# Patient Record
Sex: MALE | Race: WHITE | Employment: OTHER | URBAN - METROPOLITAN AREA
[De-identification: names, ages, dates, MRNs, and addresses within clinical notes are randomized per-mention and may not be internally consistent; named-entity substitution may affect disease eponyms.]

---

## 2017-04-17 ENCOUNTER — ALLIED HEALTH/NURSE VISIT (OUTPATIENT)
Dept: NURSING | Facility: CLINIC | Age: 72
End: 2017-04-17
Payer: COMMERCIAL

## 2017-04-17 DIAGNOSIS — H61.23 BILATERAL IMPACTED CERUMEN: Primary | ICD-10-CM

## 2017-04-17 PROCEDURE — 99207 ZZC NO CHARGE LOS: CPT

## 2017-04-17 NOTE — PROGRESS NOTES
Roman Calero is a 72 year old male who presents in clinic for possible impacted cerumen.    SYMPTOMS:  Onset of symptoms: multiple days ago with sudden onset and gradual onset.  He states that he's going to be evaluated for possible need for hearing aids and wanting to have the wax removed from ears first.    Hx of cerumen impaction?   No  Hx of surgery or rupture?  No (if yes, huddle with provider)  OBJECTIVE:  Patient appears in no distress  HEENT: both sides ear canal occluded with cerumen.      RN check prior to irrigation:Diana Costa RN  RN check post irrigation: Diana Costa RN    PLAN:  Cerumenosis is noted Wax is removed by syringing with Ear syringe and manual debridement with 1/2 strength water and 1/2 strength H202 per Tiffanie LACEY MA with assistance of Diana THOMPSON RN       Instructions for home care to prevent wax buildup are given, including OTC agents  Tilt head sideways and instill 5-10 drops twice daily up to 4 days, tip of applicator should not enter ear canal; keep drops in ear for several minutes by keeping head tilted and placing cotton in ear.    NURSING PLAN: Nursing advice to patient schedule his hearing eval      Dolores Costa, RHONDA

## 2017-05-06 DIAGNOSIS — Z12.5 ENCOUNTER FOR SCREENING FOR MALIGNANT NEOPLASM OF PROSTATE: Primary | ICD-10-CM

## 2017-05-09 ENCOUNTER — DOCUMENTATION ONLY (OUTPATIENT)
Dept: LAB | Facility: CLINIC | Age: 72
End: 2017-05-09

## 2017-05-09 DIAGNOSIS — N13.8 BPH (BENIGN PROSTATIC HYPERTROPHY) WITH URINARY OBSTRUCTION: ICD-10-CM

## 2017-05-09 DIAGNOSIS — E78.6 HDL DEFICIENCY: ICD-10-CM

## 2017-05-09 DIAGNOSIS — E78.5 HYPERLIPIDEMIA LDL GOAL <130: Primary | ICD-10-CM

## 2017-05-09 DIAGNOSIS — I25.10 CORONARY ARTERY DISEASE INVOLVING NATIVE CORONARY ARTERY OF NATIVE HEART WITHOUT ANGINA PECTORIS: ICD-10-CM

## 2017-05-09 DIAGNOSIS — R97.20 ELEVATED PSA: ICD-10-CM

## 2017-05-09 DIAGNOSIS — N40.1 BPH (BENIGN PROSTATIC HYPERTROPHY) WITH URINARY OBSTRUCTION: ICD-10-CM

## 2017-05-09 DIAGNOSIS — Z91.89 10 YEAR RISK OF MI OR STROKE 7.5% OR GREATER: ICD-10-CM

## 2017-05-09 NOTE — PROGRESS NOTES
.Please place or confirm pending lab orders for upcoming lab appointment on 05/12/17.  ThanksNadja

## 2017-05-09 NOTE — PROGRESS NOTES
Need previsit labs-see orders and close encounter if nothing else needed./Barbara Gomez,       Physical 5/22/17

## 2017-05-12 DIAGNOSIS — Z12.5 ENCOUNTER FOR SCREENING FOR MALIGNANT NEOPLASM OF PROSTATE: ICD-10-CM

## 2017-05-12 DIAGNOSIS — E78.5 HYPERLIPIDEMIA LDL GOAL <130: ICD-10-CM

## 2017-05-12 DIAGNOSIS — N13.8 BPH (BENIGN PROSTATIC HYPERTROPHY) WITH URINARY OBSTRUCTION: ICD-10-CM

## 2017-05-12 DIAGNOSIS — N40.1 BPH (BENIGN PROSTATIC HYPERTROPHY) WITH URINARY OBSTRUCTION: ICD-10-CM

## 2017-05-12 DIAGNOSIS — I25.10 CORONARY ARTERY DISEASE INVOLVING NATIVE CORONARY ARTERY OF NATIVE HEART WITHOUT ANGINA PECTORIS: ICD-10-CM

## 2017-05-12 DIAGNOSIS — E78.6 HDL DEFICIENCY: ICD-10-CM

## 2017-05-12 DIAGNOSIS — Z91.89 10 YEAR RISK OF MI OR STROKE 7.5% OR GREATER: ICD-10-CM

## 2017-05-12 DIAGNOSIS — R97.20 ELEVATED PSA: ICD-10-CM

## 2017-05-12 LAB
ALBUMIN SERPL-MCNC: 3.7 G/DL (ref 3.4–5)
ALP SERPL-CCNC: 73 U/L (ref 40–150)
ALT SERPL W P-5'-P-CCNC: 38 U/L (ref 0–70)
ANION GAP SERPL CALCULATED.3IONS-SCNC: 8 MMOL/L (ref 3–14)
AST SERPL W P-5'-P-CCNC: 22 U/L (ref 0–45)
BILIRUB SERPL-MCNC: 0.7 MG/DL (ref 0.2–1.3)
BUN SERPL-MCNC: 24 MG/DL (ref 7–30)
CALCIUM SERPL-MCNC: 8.7 MG/DL (ref 8.5–10.1)
CHLORIDE SERPL-SCNC: 108 MMOL/L (ref 94–109)
CHOLEST SERPL-MCNC: 125 MG/DL
CO2 SERPL-SCNC: 28 MMOL/L (ref 20–32)
CREAT SERPL-MCNC: 1.31 MG/DL (ref 0.66–1.25)
GFR SERPL CREATININE-BSD FRML MDRD: 54 ML/MIN/1.7M2
GLUCOSE SERPL-MCNC: 90 MG/DL (ref 70–99)
HDLC SERPL-MCNC: 52 MG/DL
LDLC SERPL CALC-MCNC: 52 MG/DL
NONHDLC SERPL-MCNC: 73 MG/DL
POTASSIUM SERPL-SCNC: 4.5 MMOL/L (ref 3.4–5.3)
PROT SERPL-MCNC: 7.4 G/DL (ref 6.8–8.8)
PSA SERPL-MCNC: 11.2 UG/L (ref 0–4)
SODIUM SERPL-SCNC: 144 MMOL/L (ref 133–144)
TRIGL SERPL-MCNC: 104 MG/DL

## 2017-05-12 PROCEDURE — 36415 COLL VENOUS BLD VENIPUNCTURE: CPT | Performed by: UROLOGY

## 2017-05-12 PROCEDURE — 80061 LIPID PANEL: CPT | Performed by: UROLOGY

## 2017-05-12 PROCEDURE — 80053 COMPREHEN METABOLIC PANEL: CPT | Performed by: UROLOGY

## 2017-05-12 PROCEDURE — 84153 ASSAY OF PSA TOTAL: CPT | Performed by: UROLOGY

## 2017-05-15 NOTE — PROGRESS NOTES
I have reviewed the schedule of future appointments and this patient has an appointment scheduled for 5-.    Visit date not found

## 2017-05-19 ENCOUNTER — TRANSFERRED RECORDS (OUTPATIENT)
Dept: HEALTH INFORMATION MANAGEMENT | Facility: CLINIC | Age: 72
End: 2017-05-19

## 2017-05-22 ENCOUNTER — OFFICE VISIT (OUTPATIENT)
Dept: FAMILY MEDICINE | Facility: CLINIC | Age: 72
End: 2017-05-22
Payer: COMMERCIAL

## 2017-05-22 VITALS
SYSTOLIC BLOOD PRESSURE: 118 MMHG | HEART RATE: 66 BPM | HEIGHT: 74 IN | DIASTOLIC BLOOD PRESSURE: 76 MMHG | WEIGHT: 210 LBS | BODY MASS INDEX: 26.95 KG/M2 | TEMPERATURE: 97.3 F

## 2017-05-22 DIAGNOSIS — R94.4 DECREASED GFR: ICD-10-CM

## 2017-05-22 DIAGNOSIS — Z23 NEED FOR PNEUMOCOCCAL VACCINATION: ICD-10-CM

## 2017-05-22 DIAGNOSIS — Z00.00 ROUTINE GENERAL MEDICAL EXAMINATION AT A HEALTH CARE FACILITY: Primary | ICD-10-CM

## 2017-05-22 LAB
ALBUMIN SERPL-MCNC: 3.6 G/DL (ref 3.4–5)
ALBUMIN UR-MCNC: NEGATIVE MG/DL
ANION GAP SERPL CALCULATED.3IONS-SCNC: 7 MMOL/L (ref 3–14)
APPEARANCE UR: CLEAR
BILIRUB UR QL STRIP: NEGATIVE
BUN SERPL-MCNC: 22 MG/DL (ref 7–30)
CALCIUM SERPL-MCNC: 9 MG/DL (ref 8.5–10.1)
CHLORIDE SERPL-SCNC: 108 MMOL/L (ref 94–109)
CO2 SERPL-SCNC: 27 MMOL/L (ref 20–32)
COLOR UR AUTO: YELLOW
CREAT SERPL-MCNC: 1.22 MG/DL (ref 0.66–1.25)
CREAT UR-MCNC: 139 MG/DL
GFR SERPL CREATININE-BSD FRML MDRD: 59 ML/MIN/1.7M2
GLUCOSE SERPL-MCNC: 83 MG/DL (ref 70–99)
GLUCOSE UR STRIP-MCNC: NEGATIVE MG/DL
HGB BLD-MCNC: 14.1 G/DL (ref 13.3–17.7)
HGB UR QL STRIP: NEGATIVE
KETONES UR STRIP-MCNC: NEGATIVE MG/DL
LEUKOCYTE ESTERASE UR QL STRIP: NEGATIVE
MICROALBUMIN UR-MCNC: 13 MG/L
MICROALBUMIN/CREAT UR: 9.14 MG/G CR (ref 0–17)
NITRATE UR QL: NEGATIVE
PH UR STRIP: 6 PH (ref 5–7)
PHOSPHATE SERPL-MCNC: 3.1 MG/DL (ref 2.5–4.5)
POTASSIUM SERPL-SCNC: 5.1 MMOL/L (ref 3.4–5.3)
PTH-INTACT SERPL-MCNC: 34 PG/ML (ref 12–72)
RBC #/AREA URNS AUTO: NORMAL /HPF (ref 0–2)
SODIUM SERPL-SCNC: 142 MMOL/L (ref 133–144)
SP GR UR STRIP: 1.01 (ref 1–1.03)
URN SPEC COLLECT METH UR: NORMAL
UROBILINOGEN UR STRIP-ACNC: 0.2 EU/DL (ref 0.2–1)
WBC #/AREA URNS AUTO: NORMAL /HPF (ref 0–2)

## 2017-05-22 PROCEDURE — 85018 HEMOGLOBIN: CPT | Performed by: FAMILY MEDICINE

## 2017-05-22 PROCEDURE — 90670 PCV13 VACCINE IM: CPT | Performed by: FAMILY MEDICINE

## 2017-05-22 PROCEDURE — 82043 UR ALBUMIN QUANTITATIVE: CPT | Performed by: FAMILY MEDICINE

## 2017-05-22 PROCEDURE — 80069 RENAL FUNCTION PANEL: CPT | Performed by: FAMILY MEDICINE

## 2017-05-22 PROCEDURE — 83970 ASSAY OF PARATHORMONE: CPT | Performed by: FAMILY MEDICINE

## 2017-05-22 PROCEDURE — G0009 ADMIN PNEUMOCOCCAL VACCINE: HCPCS | Performed by: FAMILY MEDICINE

## 2017-05-22 PROCEDURE — 36415 COLL VENOUS BLD VENIPUNCTURE: CPT | Performed by: FAMILY MEDICINE

## 2017-05-22 PROCEDURE — G0439 PPPS, SUBSEQ VISIT: HCPCS | Mod: 25 | Performed by: FAMILY MEDICINE

## 2017-05-22 PROCEDURE — 81001 URINALYSIS AUTO W/SCOPE: CPT | Performed by: FAMILY MEDICINE

## 2017-05-22 RX ORDER — SPIRONOLACT/HYDROCHLOROTHIAZID 25 MG-25MG
1 TABLET ORAL DAILY
COMMUNITY

## 2017-05-22 RX ORDER — LORATADINE 10 MG/1
10 TABLET ORAL DAILY
COMMUNITY

## 2017-05-22 NOTE — PROGRESS NOTES
SUBJECTIVE:                                                            Roman Calero is a 72 year old male who presents for Preventive Visit.      Are you in the first 12 months of your Medicare Part B coverage?  No    Healthy Habits:    Do you get at least three servings of calcium containing foods daily (dairy, green leafy vegetables, etc.)? yes    Amount of exercise or daily activities, outside of work: 6 day(s) per week    Problems taking medications regularly No    Medication side effects: No    Have you had an eye exam in the past two years? yes    Do you see a dentist twice per year? yes    Do you have sleep apnea, excessive snoring or daytime drowsiness?drowsiness    COGNITIVE SCREEN  1) Repeat 3 items (Banana, Sunrise, Chair)    2) Clock draw: NORMAL  3) 3 item recall: Recalls 3 objects  Results: 3 items recalled: COGNITIVE IMPAIRMENT LESS LIKELY    Mini-CogTM Copyright S Abelino. Licensed by the author for use in [facility]; reprinted with permission (eben@[email or web address]). All rights reserved.                Reviewed and updated as needed this visit by clinical staff  Tobacco  Allergies  Med Hx  Surg Hx  Fam Hx  Soc Hx        Reviewed and updated as needed this visit by Provider        Social History   Substance Use Topics     Smoking status: Never Smoker     Smokeless tobacco: Never Used     Alcohol use Yes      Comment: occ       The patient does not drink >3 drinks per day nor >7 drinks per week.    Today's PHQ-2 Score:   PHQ-2 ( 1999 Pfizer) 12/15/2015 5/7/2015   Q1: Little interest or pleasure in doing things 0 0   Q2: Feeling down, depressed or hopeless 0 0   PHQ-2 Score 0 0       Do you feel safe in your environment - YES    Do you have a Health Care Directive?: Yes: Patient states has Advance Directive and will bring in a copy to clinic.    Current providers sharing in care for this patient include:   Patient Care Team:  Keith Willson MD as PCP - General (Family  "Practice)      Hearing impairment: Yes,     Ability to successfully perform activities of daily living: Yes, no assistance needed     Fall risk:       Home safety:  none identified      The following health maintenance items are reviewed in Epic and correct as of today:  Health Maintenance   Topic Date Due     AORTIC ANEURYSM SCREENING (SYSTEM ASSIGNED)  03/20/2012     PNEUMOCOCCAL (2 of 2 - PCV13) 11/17/2015     INFLUENZA VACCINE (SYSTEM ASSIGNED)  09/01/2017     FALL RISK ASSESSMENT  09/21/2017     ADVANCE DIRECTIVE PLANNING Q5 YRS (NO INBASKET)  10/19/2017     COLON CANCER SCREEN (SYSTEM ASSIGNED)  04/01/2019     LIPID SCREEN Q5 YR MALE (SYSTEM ASSIGNED)  05/12/2022     TETANUS IMMUNIZATION (SYSTEM ASSIGNED)  10/28/2023     HEPATITIS C SCREENING  Completed         Pneumonia Vaccine:Adults age 65+ who received Pneumovax (PPSV23) at 65 years or older: Should be given PCV13 > 1 year after their most recent PPSV23       ASSESSMENT / PLAN:                                                                ICD-10-CM    1. Routine general medical examination at a health care facility Z00.00    2. Decreased GFR R94.4 JUST IN CASE     Hemoglobin     Renal panel     Albumin Random Urine Quantitative     Parathyroid Hormone Intact     UA with Microscopic   3. Need for pneumococcal vaccination Z23 PNEUMOCOCCAL CONJ VACCINE 13 VALENT IM       End of Life Planning:  Patient currently has an advanced directive: Yes.  Practitioner is supportive of decision.    COUNSELING:  Reviewed preventive health counseling, as reflected in patient instructions       Regular exercise       Vision screening       Dental care       Hepatitis C screening       Consider lung cancer screening for ages 55-80 years and 30 pack-year smoking history n/a       Osteoporosis Prevention/Bone Health        Estimated body mass index is 27.33 kg/(m^2) as calculated from the following:    Height as of this encounter: 6' 1.5\" (1.867 m).    Weight as of this " encounter: 210 lb (95.3 kg).     reports that he has never smoked. He has never used smokeless tobacco.      Appropriate preventive services were discussed with this patient, including applicable screening as appropriate for cardiovascular disease, diabetes, osteopenia/osteoporosis, and glaucoma.  As appropriate for age/gender, discussed screening for colorectal cancer, prostate cancer, breast cancer, and cervical cancer. Checklist reviewing preventive services available has been given to the patient.    Reviewed patients plan of care and provided an AVS. The Basic Care Plan (routine screening as documented in Health Maintenance) for Roman meets the Care Plan requirement. This Care Plan has been established and reviewed with the Patient.    Counseling Resources:  ATP IV Guidelines  Pooled Cohorts Equation Calculator  Breast Cancer Risk Calculator  FRAX Risk Assessment  ICSI Preventive Guidelines  Dietary Guidelines for Americans, 2010  [employer]'s MyPlate  ASA Prophylaxis  Lung CA Screening    Keith Willson MD  [facility]  --------------------------------------------------------------------------------------------------------------------------------------    SUBJECTIVE:  Roman Calero is a 72 year old male who presents to the clinic today for a routine physical exam.    The patient's last physical was 1 years ago.     Cholesterol   Date Value Ref Range Status   05/12/2017 125 <200 mg/dL Final   12/07/2015 164 <200 mg/dL Final     HDL Cholesterol   Date Value Ref Range Status   05/12/2017 52 >39 mg/dL Final   12/07/2015 51 >39 mg/dL Final     LDL Cholesterol Calculated   Date Value Ref Range Status   05/12/2017 52 <100 mg/dL Final     Comment:     Desirable:       <100 mg/dl   12/07/2015 94 <100 mg/dL Final     Comment:     Desirable:       <100 mg/dl     Triglycerides   Date Value Ref Range Status   05/12/2017 104 <150 mg/dL Final     Comment:     Fasting specimen   12/07/2015 93 <150 mg/dL Final      Comment:     Fasting specimen     Cholesterol/HDL Ratio   Date Value Ref Range Status   11/17/2014 3.5 0.0 - 5.0 Final   10/28/2013 4.1 0.0 - 5.0 Final     The patient's last fasting lipid panel was done 1 weeks ago and the results are listed above.        The ASCVD Risk score (Rakel ROSARIO Jr, et al., 2013) failed to calculate for the following reasons:    The valid total cholesterol range is 130 to 320 mg/dL        The patient reports that he has been treated for high blood pressure.    The patient reports that he does take a daily aspirin.    Lab Results   Component Value Date    HCVAB Negative 10/28/2013     The patient reports that he has been screened for Hepatitis C    (Screen all baby boomers once per CDC-- the generation born from 1945 through 1965)    Immunization History   Administered Date(s) Administered     Influenza (High Dose) 3 valent vaccine 10/28/2013, 09/21/2016     Influenza (IIV3) 11/14/2014     Pneumococcal 23 valent 11/17/2014     TD (ADULT, 7+) 01/01/2009     TDAP Vaccine (Adacel) 10/28/2013     Zoster vaccine, live 11/01/2014     The patient's believes that his last tetanus shot was given 4 year(s) ago.   The patient believes that he has had a Zostavax in the past  The patient believes that he has had a PPSV23 in the past.  The patient believes that he has not had a PCV13 in the past.  The patient believes that he has had a seasonal flu vaccination this fall or winter.  The patient would like to have a PCV13      No results found for this or any previous visit.]   The patient denies a family history of colon cancer.  The patient reports that he has had a colonoscopy. His  last colonoscopy was in 2009  and he  report that is was normal. The patient was told to have this repeated in 10 years.    The patient reports that he and his wife or partner are not using contraception as she has gone through menopause.    The patient denies a family history of diabetes.    The patient reports that he eats or  drinks 3 servings of dairy products per day. He does take a multivitamin with calcium once daily.  The patient reports that he has dental appointments approximately every 6 months.  The patient reports that he  has an eye examination approximately every 2 year(s).    Do you currently smoke? No  How many years have you smoked? 0   How many packs per day did you smoke on average? N/A  (if more than 30 pack year history and the patient is age 55-80 consider ordering an annual low dose radiation lung CT to screen for cancer)  (Do not order if patient has quit more than 15 years ago or has a health condition that limits life expectancy or could not tolerate curative lung surgery)  Are you interested having a lung CT to screen for lung cancer? N/A    If the patient has smoked more that 100 cigarettes, has the patient had an imaging study (US or CT) for an AAA between the ages of 65 and 75? N/A            Patient Active Problem List   Diagnosis     HDL deficiency     Elevated PSA     Trigger thumb of left hand     Hyperlipidemia LDL goal <130     BPH (benign prostatic hypertrophy) with urinary obstruction     Encounter for counseling     10 year risk of MI or stroke 7.5% or greater, 12.9% in December 2015 on Simvastatin 20     Coronary artery disease involving native coronary artery of native heart without angina pectoris     AMI (acute mesenteric ischemia) (H)       History reviewed. No pertinent surgical history.    Family History   Problem Relation Age of Onset     Hypertension Mother      Lipids Mother      Dementia Mother      CANCER Father      Hodgkin's age 63       Social History     Social History     Marital status:      Spouse name: N/A     Number of children: N/A     Years of education: N/A     Occupational History     Not on file.     Social History Main Topics     Smoking status: Never Smoker     Smokeless tobacco: Never Used     Alcohol use Yes      Comment: occ     Drug use: No     Sexual activity:  "Yes     Partners: Female     Other Topics Concern     Not on file     Social History Narrative       Current Outpatient Prescriptions   Medication Sig Dispense Refill     diphenhydrAMINE-acetaminophen (TYLENOL PM)  MG tablet Take by mouth nightly as needed for sleep       loratadine (CLARITIN) 10 MG tablet Take 10 mg by mouth daily       Multiple Vitamins-Minerals (VITRUM 50+ SENIOR MULTI) TABS Take 1 tablet by mouth daily       Calcium Polycarbophil (FIBER) 625 MG tablet Take 1,350 mg by mouth daily       atorvastatin (LIPITOR) 40 MG tablet Take 40 mg by mouth daily ADDIS.       clopidogrel (PLAVIX) 75 MG tablet Take 75 mg by mouth daily       lisinopril (PRINIVIL,ZESTRIL) 2.5 MG tablet Take 2.5 mg by mouth daily        metoprolol (LOPRESSOR) 25 MG tablet Take 0.25 mg by mouth 2 times daily        nitroglycerin (NITROSTAT) 0.4 MG SL tablet Place 0.4 mg under the tongue every 5 minutes as needed for chest pain if you are still having symptoms after 3 doses (15 minutes) call 911.       Misc Natural Products (FIBER 7 PO) Take 2 tablets by mouth daily.       aspirin 81 MG tablet Take 1 tablet by mouth daily.       Sennosides-Docusate Sodium (STOOL SOFTENER LAXATIVE PO) Take by mouth as needed Reported on 5/22/2017         Review Of Systems    Genitourinary: history of BPH for which he sees urology and her just had a PSA and a rectal exam. He does not drink a lot of fluids in the later evening and did not drink much fluids before his recent blood test.         PHYSICAL EXAMINATION:  Blood pressure 117/67, pulse 66, temperature 97.3  F (36.3  C), temperature source Oral, height 6' 1.5\" (1.867 m), weight 210 lb (95.3 kg).  General appearance - healthy, alert and no distress  Skin - Skin color, texture, turgor normal. No rashes or lesions.  Head - Normocephalic. No masses, lesions, tenderness or abnormalities  Eyes - conjunctivae/corneas clear. PERRL, EOM's intact. Fundi benign  Ears - External ears normal. Canals " clear. TM's normal.  Nose/Sinuses - Nares normal. Septum midline. Mucosa normal. No drainage or sinus tenderness.  Oropharynx - Lips, mucosa, and tongue normal. Teeth and gums normal.  Neck - Neck supple. No adenopathy. Thyroid symmetric, normal size,  Lungs - Percussion normal. Good diaphragmatic excursion. Lungs clear  Heart - PMI normal. No lifts, heaves, or thrills. RRR. No murmurs, clicks gallops or rub  Abdomen - Abdomen soft, non-tender. BS normal. No masses, organomegaly  Extremities - Extremities normal. No deformities, edema, or skin discoloration.  Musculoskeletal - Spine ROM normal. Muscular strength intact.  Peripheral pulses - radial=4/4, femoral=4/4, popliteal=4/4, dorsalis pedis=4/4,  Neuro - Gait normal. Reflexes normal and symmetric. Sensation grossly WNL.  Genitalia - Penis normal. No urethral discharge. Scrotum normal to palpation. No hernia.  Rectal - declined by the patient, see above      Orders Only on 05/12/2017   Component Date Value Ref Range Status     PSA 05/12/2017 11.20* 0 - 4 ug/L Final    Assay Method:  Chemiluminescence using Siemens Vista analyzer     Cholesterol 05/12/2017 125  <200 mg/dL Final     Triglycerides 05/12/2017 104  <150 mg/dL Final    Fasting specimen     HDL Cholesterol 05/12/2017 52  >39 mg/dL Final     LDL Cholesterol Calculated 05/12/2017 52  <100 mg/dL Final    Desirable:       <100 mg/dl     Non HDL Cholesterol 05/12/2017 73  <130 mg/dL Final     Sodium 05/12/2017 144  133 - 144 mmol/L Final     Potassium 05/12/2017 4.5  3.4 - 5.3 mmol/L Final     Chloride 05/12/2017 108  94 - 109 mmol/L Final     Carbon Dioxide 05/12/2017 28  20 - 32 mmol/L Final     Anion Gap 05/12/2017 8  3 - 14 mmol/L Final     Glucose 05/12/2017 90  70 - 99 mg/dL Final    Fasting specimen     Urea Nitrogen 05/12/2017 24  7 - 30 mg/dL Final     Creatinine 05/12/2017 1.31* 0.66 - 1.25 mg/dL Final     GFR Estimate 05/12/2017 54* >60 mL/min/1.7m2 Final    Non  GFR Calc     GFR  Estimate If Black 05/12/2017 65  >60 mL/min/1.7m2 Final    African American GFR Calc     Calcium 05/12/2017 8.7  8.5 - 10.1 mg/dL Final     Bilirubin Total 05/12/2017 0.7  0.2 - 1.3 mg/dL Final     Albumin 05/12/2017 3.7  3.4 - 5.0 g/dL Final     Protein Total 05/12/2017 7.4  6.8 - 8.8 g/dL Final     Alkaline Phosphatase 05/12/2017 73  40 - 150 U/L Final     ALT 05/12/2017 38  0 - 70 U/L Final     AST 05/12/2017 22  0 - 45 U/L Final       ASSESSMENT:    ICD-10-CM    1. Routine general medical examination at a health care facility Z00.00        Well-Adult Physical Exam.  Health Maintenance Due   Topic Date Due     AORTIC ANEURYSM SCREENING (SYSTEM ASSIGNED)  03/20/2012     PNEUMOCOCCAL (2 of 2 - PCV13) 11/17/2015     Health Maintenance   Topic Date Due     AORTIC ANEURYSM SCREENING (SYSTEM ASSIGNED)  03/20/2012     PNEUMOCOCCAL (2 of 2 - PCV13) 11/17/2015     INFLUENZA VACCINE (SYSTEM ASSIGNED)  09/01/2017     FALL RISK ASSESSMENT  09/21/2017     ADVANCE DIRECTIVE PLANNING Q5 YRS (NO INBASKET)  10/19/2017     COLON CANCER SCREEN (SYSTEM ASSIGNED)  04/01/2019     LIPID SCREEN Q5 YR MALE (SYSTEM ASSIGNED)  05/12/2022     TETANUS IMMUNIZATION (SYSTEM ASSIGNED)  10/28/2023     HEPATITIS C SCREENING  Completed         HEALTH CARE MAINTENENCE: The recommended screening tests and vaccinatons for this patient have been discussed as above.  The appropriate tests and vaccinations  have been ordered or declined by the patient. Please see the orders in EPIC.The patient specifically declines: n/a     Immunization Status:  up to date and documented except for PCV13    Patient Active Problem List   Diagnosis     HDL deficiency     Elevated PSA     Trigger thumb of left hand     Hyperlipidemia LDL goal <130     BPH (benign prostatic hypertrophy) with urinary obstruction     Encounter for counseling     10 year risk of MI or stroke 7.5% or greater, 12.9% in December 2015 on Simvastatin 20     Coronary artery disease involving native  coronary artery of native heart without angina pectoris     AMI (acute mesenteric ischemia) (H)        ATP III Guidelines  ICSI Preventive Guidelines    PLAN:   I recommended continuing to take a daily aspirin (81 to 325 mg)  PCV13 recommended  Discussed calcium intake, vitamins and supplements. Recommended 1000 mg of calcium daily  Sunscreen use was recommended especially in the area of tatoos  Recommended dental exams every 6 months  Recommended eye exam every 1-2 years  Follow up in 1 year for the next preventative medical visit      The patient reported that he did not need any refills at this time.      Screening Questionnaire for Adult Immunization    Are you sick today?   No   Do you have allergies to medications, food, a vaccine component or latex?   No   Have you ever had a serious reaction after receiving a vaccination?   No   Do you have a long-term health problem with heart disease, lung disease, asthma, kidney disease, metabolic disease (e.g. diabetes), anemia, or other blood disorder?   No   Do you have cancer, leukemia, HIV/AIDS, or any other immune system problem?   No   In the past 3 months, have you taken medications that affect  your immune system, such as prednisone, other steroids, or anticancer drugs; drugs for the treatment of rheumatoid arthritis, Crohn s disease, or psoriasis; or have you had radiation treatments?   No   Have you had a seizure, or a brain or other nervous system problem?   No   During the past year, have you received a transfusion of blood or blood     products, or been given immune (gamma) globulin or antiviral drug?   No   For women: Are you pregnant or is there a chance you could become        pregnant during the next month?   No   Have you received any vaccinations in the past 4 weeks?   No     Immunization questionnaire answers were all negative.      MNVFC doesn't apply on this patient    Per orders of Dr. Che, injection of PCV13 given by Mariluz Fry. Patient  instructed to remain in clinic for 20 minutes afterwards, and to report any adverse reaction to me immediately.       Screening performed by Mariluz Fry on 5/22/2017 at 12:28 PM.

## 2017-05-22 NOTE — PATIENT INSTRUCTIONS
Preventive Health Recommendations:   Male Ages 65 and over    Yearly exam:             See your health care provider every year in order to  o   Review health changes.   o   Discuss preventive care.    o   Review your medicines if your doctor has prescribed any.    Talk with your health care provider about whether you should have a test to screen for prostate cancer (PSA).    Every 3 years, have a diabetes test (fasting glucose). If you are at risk for diabetes, you should have this test more often.    Every 5 years, have a cholesterol test. Have this test more often if you are at risk for high cholesterol or heart disease.     Every 10 years, have a colonoscopy. Or, have a yearly FIT test (stool test). These exams will check for colon cancer.    Talk to with your health care provider about screening for Abdominal Aortic Aneurysm if you have a family history of AAA or have a history of smoking.    Shots:     Get a flu shot each year.     Get a tetanus shot every 10 years.     Talk to your doctor about your pneumonia vaccines. There are now two you should receive - Pneumovax (PPSV 23) and Prevnar (PCV 13).     Talk to your doctor about a shingles vaccine.     Talk to your doctor about the hepatitis B vaccine.  Nutrition:     Eat at least 5 servings of fruits and vegetables each day.     Eat whole-grain bread, whole-wheat pasta and brown rice instead of white grains and rice.     Talk to your provider about Calcium and Vitamin D.   Lifestyle    Exercise for at least 150 minutes a week (30 minutes a day, 5 days a week). This will help you control your weight and prevent disease.     Limit alcohol to one drink per day.     No smoking.     Wear sunscreen to prevent skin cancer.     See your dentist every six months for an exam and cleaning.     See your eye doctor every 1 to 2 years to screen for conditions such as glaucoma, macular degeneration, cataracts, etc     He was intructed to use mineral or olive oil to  prevent reacummulation of the cerumen. he was advised to put 2-3 drops into each ear 2-3 times a week before showers of baths and to rinse away any of the oily residue at the end of his shower.

## 2017-05-22 NOTE — MR AVS SNAPSHOT
After Visit Summary   5/22/2017    Roman Calero    MRN: [id number]           Patient Information     Date Of Birth          1945        Visit Information        Provider Department      5/22/2017 11:00 AM Keith Willson MD [facility]        Today's Diagnoses     Routine general medical examination at a health care facility    -  1    Decreased GFR        Need for pneumococcal vaccination          Care Instructions      Preventive Health Recommendations:   Male Ages 65 and over    Yearly exam:             See your health care provider every year in order to  o   Review health changes.   o   Discuss preventive care.    o   Review your medicines if your doctor has prescribed any.    Talk with your health care provider about whether you should have a test to screen for prostate cancer (PSA).    Every 3 years, have a diabetes test (fasting glucose). If you are at risk for diabetes, you should have this test more often.    Every 5 years, have a cholesterol test. Have this test more often if you are at risk for high cholesterol or heart disease.     Every 10 years, have a colonoscopy. Or, have a yearly FIT test (stool test). These exams will check for colon cancer.    Talk to with your health care provider about screening for Abdominal Aortic Aneurysm if you have a family history of AAA or have a history of smoking.    Shots:     Get a flu shot each year.     Get a tetanus shot every 10 years.     Talk to your doctor about your pneumonia vaccines. There are now two you should receive - Pneumovax (PPSV 23) and Prevnar (PCV 13).     Talk to your doctor about a shingles vaccine.     Talk to your doctor about the hepatitis B vaccine.  Nutrition:     Eat at least 5 servings of fruits and vegetables each day.     Eat whole-grain bread, whole-wheat pasta and brown rice instead of white grains and rice.     Talk to your provider about Calcium and Vitamin D.   Lifestyle    Exercise for at least  150 minutes a week (30 minutes a day, 5 days a week). This will help you control your weight and prevent disease.     Limit alcohol to one drink per day.     No smoking.     Wear sunscreen to prevent skin cancer.     See your dentist every six months for an exam and cleaning.     See your eye doctor every 1 to 2 years to screen for conditions such as glaucoma, macular degeneration, cataracts, etc     He was intructed to use mineral or olive oil to prevent reacummulation of the cerumen. he was advised to put 2-3 drops into each ear 2-3 times a week before showers of baths and to rinse away any of the oily residue at the end of his shower.          Follow-ups after your visit        Follow-up notes from your care team     Return in about 1 year (around 5/22/2018) for Physical Exam.      Who to contact     If you have questions or need follow up information about today's clinic visit or your schedule please contact [facility] directly at [phone].  Normal or non-critical lab and imaging results will be communicated to you by Redeemhart, letter or phone within 4 business days after the clinic has received the results. If you do not hear from us within 7 days, please contact the clinic through Letsgofordinner or phone. If you have a critical or abnormal lab result, we will notify you by phone as soon as possible.  Submit refill requests through Letsgofordinner or call your pharmacy and they will forward the refill request to us. Please allow 3 business days for your refill to be completed.          Additional Information About Your Visit        Letsgofordinner Information     Letsgofordinner gives you secure access to your electronic health record. If you see a primary care provider, you can also send messages to your care team and make appointments. If you have questions, please call your primary care clinic.  If you do not have a primary care provider, please call [phone] and they will assist you.        Care EveryWhere ID      "This is your Care EveryWhere ID. This could be used by other organizations to access your [place] medical records  [id number]        Your Vitals Were     Pulse Temperature Height BMI (Body Mass Index)          66 97.3  F (36.3  C) (Oral) 6' 1.5\" (1.867 m) 27.33 kg/m2         Blood Pressure from Last 3 Encounters:   05/22/17 118/76   10/06/16 108/60   09/23/16 109/68    Weight from Last 3 Encounters:   05/22/17 210 lb (95.3 kg)   10/06/16 216 lb (98 kg)   09/21/16 214 lb (97.1 kg)              We Performed the Following     Albumin Random Urine Quantitative     Hemoglobin     JUST IN CASE     Parathyroid Hormone Intact     PNEUMOCOCCAL CONJ VACCINE 13 VALENT IM     Renal panel     UA with Microscopic        Primary Care Provider Office Phone # Fax #    Keith Willson [phone]       [facility] [address] [facility] [address]        Thank you!     Thank you for choosing [facility]  for your care. Our goal is always to provide you with excellent care. Hearing back from our patients is one way we can continue to improve our services. Please take a few minutes to complete the written survey that you may receive in the mail after your visit with us. Thank you!             Your Updated Medication List - Protect others around you: Learn how to safely use, store and throw away your medicines at www.disposemymeds.org.          This list is accurate as of: 5/22/17 11:57 AM.  Always use your most recent med list.                   Brand Name Dispense Instructions for use    aspirin 81 MG tablet      Take 1 tablet by mouth daily.       CLARITIN 10 MG tablet   Generic drug:  loratadine      Take 10 mg by mouth daily       diphenhydrAMINE-acetaminophen  MG tablet    TYLENOL PM     Take by mouth nightly as needed for sleep       Fiber 625 MG tablet      Take 1,350 mg by mouth daily       FIBER 7 PO      Take 2 tablets by mouth daily.       LIPITOR 40 MG tablet "   Generic drug:  atorvastatin      Take 40 mg by mouth daily ADDIS.       lisinopril 2.5 MG tablet    PRINIVIL/Zestril     Take 2.5 mg by mouth daily       metoprolol 25 MG tablet    LOPRESSOR     Take 0.25 mg by mouth 2 times daily       NITROSTAT 0.4 MG sublingual tablet   Generic drug:  nitroglycerin      Place 0.4 mg under the tongue every 5 minutes as needed for chest pain if you are still having symptoms after 3 doses (15 minutes) call 911.       PLAVIX 75 MG tablet   Generic drug:  clopidogrel      Take 75 mg by mouth daily       STOOL SOFTENER LAXATIVE PO      Take by mouth as needed Reported on 5/22/2017       VITRUM 50+ SENIOR MULTI Tabs      Take 1 tablet by mouth daily

## 2017-05-22 NOTE — NURSING NOTE
"Chief Complaint   Patient presents with     Wellness Visit       Initial /67  Pulse 66  Temp 97.3  F (36.3  C) (Oral)  Ht 6' 1.5\" (1.867 m)  Wt 210 lb (95.3 kg)  BMI 27.33 kg/m2 Estimated body mass index is 27.33 kg/(m^2) as calculated from the following:    Height as of this encounter: 6' 1.5\" (1.867 m).    Weight as of this encounter: 210 lb (95.3 kg).  Medication Reconciliation: complete  Mariluz Fry M.A.      "

## 2017-05-23 DIAGNOSIS — R94.4 DECREASED GFR: Primary | ICD-10-CM

## 2017-05-23 NOTE — PROGRESS NOTES
Roman,  I have reviewed the results of the laboratory tests that we  Ordered today.. The test of the kidney function looks better than when you were fasting. I want to recheck these labs in 1 month(s) or so and I want you to make sure that you are well hydrated before you have the blood drawn.   Sincerely,   Keith Willson

## 2017-06-19 ENCOUNTER — DOCUMENTATION ONLY (OUTPATIENT)
Dept: VASCULAR SURGERY | Facility: CLINIC | Age: 72
End: 2017-06-19

## 2017-06-19 DIAGNOSIS — Z13.6 ENCOUNTER FOR ABDOMINAL AORTIC ANEURYSM (AAA) SCREENING: Primary | ICD-10-CM

## 2017-08-15 ENCOUNTER — TELEPHONE (OUTPATIENT)
Dept: AUDIOLOGY | Facility: CLINIC | Age: 72
End: 2017-08-15

## 2017-08-15 DIAGNOSIS — H91.93 HEARING DIFFICULTY OF BOTH EARS: Primary | ICD-10-CM

## 2017-08-15 NOTE — TELEPHONE ENCOUNTER
Dear Dr. Willson,     To be in compliance with some payers, we must have a Physician's Order to perform Audiology services. Your patient, Roman, has an appointment to be seen by one of our Audiologists. Please complete the attached referral order.    We thank you for your cooperation. If you have any questions, please feel free to contact me.    Chriss Richardson [facility]  Licensed Audiologist #[id number] [phone]

## 2017-08-16 ENCOUNTER — TELEPHONE (OUTPATIENT)
Dept: AUDIOLOGY | Facility: CLINIC | Age: 72
End: 2017-08-16

## 2017-08-16 NOTE — TELEPHONE ENCOUNTER
Reason for Call:  Other call back    Detailed comments: Wife is wondering if patient needs to having his ears cleaned for his appointment at 3pm. They were unable to get a schedule in with [place] nurse to clean this, please call to advise as appt is 3pm today.     Phone Number Patient can be reached at: Cell number on file:    Telephone Information:   Mobile [phone]       Best Time: any    Can we leave a detailed message on this number? YES    Call taken on 8/16/2017 at 8:44 AM by Chris Sarkar

## 2017-08-16 NOTE — TELEPHONE ENCOUNTER
I spoke with Kaci about Roman's cerumen issues. Kaci feels that Roman's cerumen will be occluding enough to prevent him from obtaining an accurate hearing test. The number to reschedule was provided to Kaci. They will reschedule to a time when Roman can get his ears cleaned prior to the audiology appointment.     -Chriss Richardson CCC-A

## 2017-08-29 ENCOUNTER — ALLIED HEALTH/NURSE VISIT (OUTPATIENT)
Dept: NURSING | Facility: CLINIC | Age: 72
End: 2017-08-29
Payer: COMMERCIAL

## 2017-08-29 DIAGNOSIS — H61.23 BILATERAL IMPACTED CERUMEN: Primary | ICD-10-CM

## 2017-08-29 PROCEDURE — 69209 REMOVE IMPACTED EAR WAX UNI: CPT | Mod: 50

## 2017-08-29 PROCEDURE — 99207 ZZC NO CHARGE NURSE ONLY: CPT

## 2017-08-29 NOTE — PROGRESS NOTES
Roman Calero is a 72 year old male who presents in clinic for possible impacted cerumen.    SYMPTOMS:  Onset of symptoms: weeks ago with gradual onset    Hx of cerumen impaction?   Yes  Hx of surgery or rupture?  No (if yes, huddle with provider)  OBJECTIVE:  Patient appears in no distress  HEENT: both sides ear canal occluded with cerumen.      RN check prior to irrigation:Candy Yoon RN  RN check post irrigation: Candy Yoon RN  Impacted cerumen noted in both ears. Patient tolerated well, no concerns at end of procedure.   Candy Yoon RN   [facility]       PLAN:  Cerumenosis is noted Wax is removed by syringing with Ear syringe and manual debridement with 1/2 strength water and 1/2 strength H202       Instructions for home care to prevent wax buildup are given, including OTC agents  Tilt head sideways and instill 5-10 drops twice daily up to 4 days, tip of applicator should not enter ear canal; keep drops in ear for several minutes by keeping head tilted and placing cotton in ear.    NURSING PLAN:     RECOMMENDED DISPOSITION:    Will comply with recommendation:   Iliana Hernandez MA

## 2017-08-29 NOTE — MR AVS SNAPSHOT
After Visit Summary   8/29/2017    Roman Calero    MRN: [id number]           Patient Information     Date Of Birth          1945        Visit Information        Provider Department      8/29/2017 11:00 AM AN ANCILLARY [facility]        Today's Diagnoses     Bilateral impacted cerumen    -  1       Follow-ups after your visit        Your next 10 appointments already scheduled     Aug 30, 2017 12:30 PM CDT   New Visit with Anatoly Rice   [facility]    [address] [place] [id number] [phone]              Who to contact     If you have questions or need follow up information about today's clinic visit or your schedule please contact [facility] directly at [phone].  Normal or non-critical lab and imaging results will be communicated to you by MyChart, letter or phone within 4 business days after the clinic has received the results. If you do not hear from us within 7 days, please contact the clinic through MyChart or phone. If you have a critical or abnormal lab result, we will notify you by phone as soon as possible.  Submit refill requests through Peppercorn or call your pharmacy and they will forward the refill request to us. Please allow 3 business days for your refill to be completed.          Additional Information About Your Visit        MyChart Information     Peppercorn gives you secure access to your electronic health record. If you see a primary care provider, you can also send messages to your care team and make appointments. If you have questions, please call your primary care clinic.  If you do not have a primary care provider, please call [phone] and they will assist you.        Care EveryWhere ID     This is your Care EveryWhere ID. This could be used by other organizations to access your [place] medical records  [id number]         Blood Pressure from Last 3 Encounters:    05/22/17 118/76   10/06/16 108/60   09/23/16 109/68    Weight from Last 3 Encounters:   05/22/17 210 lb (95.3 kg)   10/06/16 216 lb (98 kg)   09/21/16 214 lb (97.1 kg)              We Performed the Following     HC REMOVAL IMPACTED CERUMEN IRRIGATION/LVG UNILAT        Primary Care Provider Office Phone # Fax #    Keith Willson [phone] [address] [facility] [address]        Equal Access to Services     [facility]: Hadii aad ku hadasho Soomaali, waaxda luqadaha, qaybta kaalmada adeegyamaryanne, curt frank . So [facility] [phone].    ATENCIÓN: Si habla español, tiene a lozano disposición servicios gratuitos de asistencia lingüística. [facility] [phone].    We comply with applicable federal civil rights laws and [place] laws. We do not discriminate on the basis of race, color, national origin, age, disability sex, sexual orientation or gender identity.            Thank you!     Thank you for choosing [facility] [place]  for your care. Our goal is always to provide you with excellent care. Hearing back from our patients is one way we can continue to improve our services. Please take a few minutes to complete the written survey that you may receive in the mail after your visit with us. Thank you!             Your Updated Medication List - Protect others around you: Learn how to safely use, store and throw away your medicines at www.disposemymeds.org.          This list is accurate as of: 8/29/17 11:13 AM.  Always use your most recent med list.                   Brand Name Dispense Instructions for use Diagnosis    aspirin 81 MG tablet      Take 1 tablet by mouth daily.        CLARITIN 10 MG tablet   Generic drug:  loratadine      Take 10 mg by mouth daily        diphenhydrAMINE-acetaminophen  MG tablet    TYLENOL PM     Take by mouth nightly as needed for sleep        Fiber 625 MG tablet      Take 1,350 mg by mouth daily        FIBER 7 PO       Take 2 tablets by mouth daily.        LIPITOR 40 MG tablet   Generic drug:  atorvastatin      Take 40 mg by mouth daily ADDIS.        lisinopril 2.5 MG tablet    PRINIVIL/Zestril     Take 2.5 mg by mouth daily        metoprolol 25 MG tablet    LOPRESSOR     Take 0.25 mg by mouth 2 times daily        NITROSTAT 0.4 MG sublingual tablet   Generic drug:  nitroGLYcerin      Place 0.4 mg under the tongue every 5 minutes as needed for chest pain if you are still having symptoms after 3 doses (15 minutes) call 911.        PLAVIX 75 MG tablet   Generic drug:  clopidogrel      Take 75 mg by mouth daily        STOOL SOFTENER LAXATIVE PO      Take by mouth as needed Reported on 5/22/2017        VITRUM 50+ SENIOR MULTI Tabs      Take 1 tablet by mouth daily

## 2017-08-30 ENCOUNTER — OFFICE VISIT (OUTPATIENT)
Dept: AUDIOLOGY | Facility: CLINIC | Age: 72
End: 2017-08-30
Payer: COMMERCIAL

## 2017-08-30 DIAGNOSIS — H90.3 SENSORINEURAL HEARING LOSS, BILATERAL: Primary | ICD-10-CM

## 2017-08-30 PROCEDURE — 92557 COMPREHENSIVE HEARING TEST: CPT | Performed by: AUDIOLOGIST

## 2017-08-30 PROCEDURE — 92567 TYMPANOMETRY: CPT | Performed by: AUDIOLOGIST

## 2017-08-30 PROCEDURE — 99207 ZZC NO CHARGE LOS: CPT | Performed by: AUDIOLOGIST

## 2017-08-30 NOTE — PROGRESS NOTES
AUDIOLOGY REPORT    SUBJECTIVE:  Roman Calero is a 72 year old male who was seen in the Audiology Clinic at [facility] for audiologic evaluation, referred by Dr. Willson.  The patient reports a gradual decline in hearing. The patient denies  bilateral tinnitus, bilateral otalgia, bilateral drainage, bilateral aural fullness, family history of hearing loss, history of noise exposure, and dizziness.  The patient notes difficulty with communication in a variety of listening situations.  They were accompanied today by their wife, Kaci.     OBJECTIVE:    Otoscopic exam indicates ears are clear of cerumen bilaterally     Pure Tone Thresholds assessed using conventional audiometry with good  reliability from 250-8000 Hz bilaterally using insert earphones     RIGHT:  mild and moderate sensorineural hearing loss    LEFT:    mild and moderate sensorineural hearing loss    Tympanogram:    RIGHT: normal eardrum mobility    LEFT:   normal eardrum mobility    Speech Reception Threshold:    RIGHT: 40 dB HL    LEFT:   40 dB HL    Word Recognition Score:     RIGHT: 72% at 80 dB HL using NU-6 recorded word list.    LEFT:   76% at 80 dB HL using NU-6 recorded word list.      ASSESSMENT:   Sensorineural hearing loss      Today s results were discussed with the patient and spouse in detail. Patient requested and was provided a copy of his audiogram.      PLAN:  Patient was counseled regarding hearing loss and impact on communication.  Patient is a good candidate for amplification at this time.  Handout on good communication strategies, and hearing aid use was given to patient. It is recommended that the patient return for a hearing aid consultation appointment. Please call this clinic with questions regarding these results or recommendations.    Chriss Richardson CCC-A  Licensed Audiologist #[id number]  8/30/2017    CC: Dr. Willson

## 2017-08-30 NOTE — MR AVS SNAPSHOT
After Visit Summary   8/30/2017    Roman Calero    MRN: [id number]           Patient Information     Date Of Birth          1945        Visit Information        Provider Department      8/30/2017 12:30 PM [facility]iss [facility]        Today's Diagnoses     Sensorineural hearing loss, bilateral    -  1       Follow-ups after your visit        Who to contact     If you have questions or need follow up information about today's clinic visit or your schedule please contact [facility] directly at [phone].  Normal or non-critical lab and imaging results will be communicated to you by Benvenue Medicalhart, letter or phone within 4 business days after the clinic has received the results. If you do not hear from us within 7 days, please contact the clinic through Pya Analyticst or phone. If you have a critical or abnormal lab result, we will notify you by phone as soon as possible.  Submit refill requests through Kimeltu or call your pharmacy and they will forward the refill request to us. Please allow 3 business days for your refill to be completed.          Additional Information About Your Visit        MyChart Information     Kimeltu gives you secure access to your electronic health record. If you see a primary care provider, you can also send messages to your care team and make appointments. If you have questions, please call your primary care clinic.  If you do not have a primary care provider, please call [phone] and they will assist you.        Care EveryWhere ID     This is your Care EveryWhere ID. This could be used by other organizations to access your [place] medical records  [id number]         Blood Pressure from Last 3 Encounters:   05/22/17 118/76   10/06/16 108/60   09/23/16 109/68    Weight from Last 3 Encounters:   05/22/17 210 lb (95.3 kg)   10/06/16 216 lb (98 kg)   09/21/16 214 lb (97.1 kg)              We Performed the Following     COMPREHENSIVE  HEARING TEST     TYMPANOMETRY        Primary Care Provider Office Phone # Fax #    Keith Willson [phone] [address] [facility] [address]        Equal Access to Services     EBONIE BOYD : Anthony radha ku ronyo Soraheemali, waaxda luqadaha, qaybta kaalmada adetim, curt hinklebrayan tillman. So [facility] [phone].    ATENCIÓN: Si habla español, tiene a lozano disposición servicios gratuitos de asistencia lingüística. Llame al [phone].    We comply with applicable federal civil rights laws and [place] laws. We do not discriminate on the basis of race, color, national origin, age, disability sex, sexual orientation or gender identity.            Thank you!     Thank you for choosing [facility]  for your care. Our goal is always to provide you with excellent care. Hearing back from our patients is one way we can continue to improve our services. Please take a few minutes to complete the written survey that you may receive in the mail after your visit with us. Thank you!             Your Updated Medication List - Protect others around you: Learn how to safely use, store and throw away your medicines at www.disposemymeds.org.          This list is accurate as of: 8/30/17  1:13 PM.  Always use your most recent med list.                   Brand Name Dispense Instructions for use Diagnosis    aspirin 81 MG tablet      Take 1 tablet by mouth daily.        CLARITIN 10 MG tablet   Generic drug:  loratadine      Take 10 mg by mouth daily        diphenhydrAMINE-acetaminophen  MG tablet    TYLENOL PM     Take by mouth nightly as needed for sleep        Fiber 625 MG tablet      Take 1,350 mg by mouth daily        FIBER 7 PO      Take 2 tablets by mouth daily.        LIPITOR 40 MG tablet   Generic drug:  atorvastatin      Take 40 mg by mouth daily ADDIS.        lisinopril 2.5 MG tablet    PRINIVIL/Zestril     Take 2.5 mg by mouth daily        metoprolol 25 MG  tablet    LOPRESSOR     Take 0.25 mg by mouth 2 times daily        NITROSTAT 0.4 MG sublingual tablet   Generic drug:  nitroGLYcerin      Place 0.4 mg under the tongue every 5 minutes as needed for chest pain if you are still having symptoms after 3 doses (15 minutes) call 911.        PLAVIX 75 MG tablet   Generic drug:  clopidogrel      Take 75 mg by mouth daily        STOOL SOFTENER LAXATIVE PO      Take by mouth as needed Reported on 5/22/2017        VITRUM 50+ SENIOR MULTI Tabs      Take 1 tablet by mouth daily

## 2017-09-12 ENCOUNTER — TRANSFERRED RECORDS (OUTPATIENT)
Dept: HEALTH INFORMATION MANAGEMENT | Facility: CLINIC | Age: 72
End: 2017-09-12

## 2018-02-26 ENCOUNTER — TELEPHONE (OUTPATIENT)
Dept: FAMILY MEDICINE | Facility: CLINIC | Age: 73
End: 2018-02-26

## 2018-02-26 RX ORDER — LISINOPRIL 2.5 MG/1
2.5 TABLET ORAL DAILY
Qty: 30 TABLET | Status: CANCELLED | OUTPATIENT
Start: 2018-02-26

## 2018-02-26 RX ORDER — CLOPIDOGREL BISULFATE 75 MG/1
75 TABLET ORAL DAILY
Qty: 30 TABLET | Status: CANCELLED | OUTPATIENT
Start: 2018-02-26

## 2018-02-26 NOTE — TELEPHONE ENCOUNTER
Patient is requesting a refill for clopidogrel (PLAVIX) 75 MG tablet, and lisinopril (PRINIVIL,ZESTRIL) 2.5 MG tablet   Please call to discuss  Thank you

## 2018-02-26 NOTE — TELEPHONE ENCOUNTER
Spoke to Kaci, spouse, Release to share Personal Health Information is identified, 5/22/17,  is informed of MD note below, as it is written. Verbalized good understanding.  Radha Sahu RN

## 2018-02-26 NOTE — TELEPHONE ENCOUNTER
The patient got these prescription(s) from [facility] cardiology and should get the refill(s) from them as well for now.   Keith Willson MD

## 2018-02-26 NOTE — TELEPHONE ENCOUNTER
Last office visit 5/22/17.    Plan unclear for Plavix and Lisinopril.  Patient was to have repeat Renal panel, which has not been done.  Please advise    Last Basic Metabolic Panel:  Lab Results   Component Value Date     05/22/2017      Lab Results   Component Value Date    POTASSIUM 5.1 05/22/2017     Lab Results   Component Value Date    CHLORIDE 108 05/22/2017     Lab Results   Component Value Date    ALISHA 9.0 05/22/2017     Lab Results   Component Value Date    CO2 27 05/22/2017     Lab Results   Component Value Date    BUN 22 05/22/2017     Lab Results   Component Value Date    CR 1.22 05/22/2017     Lab Results   Component Value Date    GLC 83 05/22/2017     GFR Estimate   Date Value Ref Range Status   05/22/2017 59 (L) >60 mL/min/1.7m2 Final     Comment:     Non  GFR Calc   05/12/2017 54 (L) >60 mL/min/1.7m2 Final     Comment:     Non  GFR Calc   12/07/2015 65 >60 mL/min/1.7m2 Final     Comment:     Non  GFR Calc     GFR Estimate If Black   Date Value Ref Range Status   05/22/2017 71 >60 mL/min/1.7m2 Final     Comment:      GFR Calc   05/12/2017 65 >60 mL/min/1.7m2 Final     Comment:      GFR Calc   12/07/2015 79 >60 mL/min/1.7m2 Final     Comment:      GFR Calc       Radha Sahu RN

## 2018-05-02 ENCOUNTER — DOCUMENTATION ONLY (OUTPATIENT)
Dept: LAB | Facility: CLINIC | Age: 73
End: 2018-05-02

## 2018-05-02 DIAGNOSIS — Z91.89 10 YEAR RISK OF MI OR STROKE 7.5% OR GREATER: ICD-10-CM

## 2018-05-02 DIAGNOSIS — N18.30 CKD (CHRONIC KIDNEY DISEASE) STAGE 3, GFR 30-59 ML/MIN (H): ICD-10-CM

## 2018-05-02 DIAGNOSIS — E78.6 HDL DEFICIENCY: ICD-10-CM

## 2018-05-02 DIAGNOSIS — N13.8 BENIGN PROSTATIC HYPERPLASIA WITH URINARY OBSTRUCTION: Primary | ICD-10-CM

## 2018-05-02 DIAGNOSIS — I25.10 CORONARY ARTERY DISEASE INVOLVING NATIVE CORONARY ARTERY OF NATIVE HEART WITHOUT ANGINA PECTORIS: ICD-10-CM

## 2018-05-02 DIAGNOSIS — E78.5 HYPERLIPIDEMIA LDL GOAL <130: ICD-10-CM

## 2018-05-02 DIAGNOSIS — N40.1 BENIGN PROSTATIC HYPERPLASIA WITH URINARY OBSTRUCTION: Primary | ICD-10-CM

## 2018-05-02 NOTE — PROGRESS NOTES
.Please place or confirm pending lab orders for upcoming lab appointment on 5/14/18  Thank you,  Payal

## 2018-05-02 NOTE — PROGRESS NOTES
Please review add or remove and sign Pending Pre-visit Labs in Epic.Labs 05/14/18 and Physical 05/23/18   Eloisa SALINAS

## 2018-05-14 DIAGNOSIS — N18.30 CKD (CHRONIC KIDNEY DISEASE) STAGE 3, GFR 30-59 ML/MIN (H): ICD-10-CM

## 2018-05-14 DIAGNOSIS — N13.8 BENIGN PROSTATIC HYPERPLASIA WITH URINARY OBSTRUCTION: ICD-10-CM

## 2018-05-14 DIAGNOSIS — I25.10 CORONARY ARTERY DISEASE INVOLVING NATIVE CORONARY ARTERY OF NATIVE HEART WITHOUT ANGINA PECTORIS: ICD-10-CM

## 2018-05-14 DIAGNOSIS — N40.1 BENIGN PROSTATIC HYPERPLASIA WITH URINARY OBSTRUCTION: ICD-10-CM

## 2018-05-14 DIAGNOSIS — R97.20 ELEVATED PSA: ICD-10-CM

## 2018-05-14 DIAGNOSIS — E78.5 HYPERLIPIDEMIA LDL GOAL <130: ICD-10-CM

## 2018-05-14 LAB
ALBUMIN SERPL-MCNC: 3.6 G/DL (ref 3.4–5)
ALP SERPL-CCNC: 69 U/L (ref 40–150)
ALT SERPL W P-5'-P-CCNC: 31 U/L (ref 0–70)
ANION GAP SERPL CALCULATED.3IONS-SCNC: 6 MMOL/L (ref 3–14)
AST SERPL W P-5'-P-CCNC: 23 U/L (ref 0–45)
BILIRUB SERPL-MCNC: 0.7 MG/DL (ref 0.2–1.3)
BUN SERPL-MCNC: 27 MG/DL (ref 7–30)
CALCIUM SERPL-MCNC: 8.8 MG/DL (ref 8.5–10.1)
CHLORIDE SERPL-SCNC: 109 MMOL/L (ref 94–109)
CHOLEST SERPL-MCNC: 129 MG/DL
CO2 SERPL-SCNC: 27 MMOL/L (ref 20–32)
CREAT SERPL-MCNC: 1.34 MG/DL (ref 0.66–1.25)
CREAT UR-MCNC: 134 MG/DL
GFR SERPL CREATININE-BSD FRML MDRD: 53 ML/MIN/1.7M2
GLUCOSE SERPL-MCNC: 85 MG/DL (ref 70–99)
HDLC SERPL-MCNC: 46 MG/DL
HGB BLD-MCNC: 14.9 G/DL (ref 13.3–17.7)
LDLC SERPL CALC-MCNC: 59 MG/DL
MICROALBUMIN UR-MCNC: 15 MG/L
MICROALBUMIN/CREAT UR: 11.49 MG/G CR (ref 0–17)
NONHDLC SERPL-MCNC: 83 MG/DL
POTASSIUM SERPL-SCNC: 4.9 MMOL/L (ref 3.4–5.3)
PROT SERPL-MCNC: 7.4 G/DL (ref 6.8–8.8)
PSA SERPL-ACNC: 11.2 UG/L (ref 0–4)
PTH-INTACT SERPL-MCNC: 47 PG/ML (ref 18–80)
SODIUM SERPL-SCNC: 142 MMOL/L (ref 133–144)
TRIGL SERPL-MCNC: 118 MG/DL

## 2018-05-14 PROCEDURE — 83970 ASSAY OF PARATHORMONE: CPT | Performed by: FAMILY MEDICINE

## 2018-05-14 PROCEDURE — 82043 UR ALBUMIN QUANTITATIVE: CPT | Performed by: FAMILY MEDICINE

## 2018-05-14 PROCEDURE — 80053 COMPREHEN METABOLIC PANEL: CPT | Performed by: FAMILY MEDICINE

## 2018-05-14 PROCEDURE — 84153 ASSAY OF PSA TOTAL: CPT | Performed by: FAMILY MEDICINE

## 2018-05-14 PROCEDURE — 36415 COLL VENOUS BLD VENIPUNCTURE: CPT | Performed by: FAMILY MEDICINE

## 2018-05-14 PROCEDURE — 85018 HEMOGLOBIN: CPT | Performed by: FAMILY MEDICINE

## 2018-05-14 PROCEDURE — 80061 LIPID PANEL: CPT | Performed by: FAMILY MEDICINE

## 2018-05-30 ENCOUNTER — TRANSFERRED RECORDS (OUTPATIENT)
Dept: HEALTH INFORMATION MANAGEMENT | Facility: CLINIC | Age: 73
End: 2018-05-30

## 2018-06-12 ENCOUNTER — OFFICE VISIT (OUTPATIENT)
Dept: FAMILY MEDICINE | Facility: CLINIC | Age: 73
End: 2018-06-12
Payer: COMMERCIAL

## 2018-06-12 VITALS
RESPIRATION RATE: 20 BRPM | TEMPERATURE: 97 F | HEART RATE: 56 BPM | BODY MASS INDEX: 28.1 KG/M2 | HEIGHT: 73 IN | SYSTOLIC BLOOD PRESSURE: 113 MMHG | WEIGHT: 212 LBS | DIASTOLIC BLOOD PRESSURE: 63 MMHG | OXYGEN SATURATION: 98 %

## 2018-06-12 DIAGNOSIS — I25.10 CORONARY ARTERY DISEASE INVOLVING NATIVE CORONARY ARTERY OF NATIVE HEART WITHOUT ANGINA PECTORIS: ICD-10-CM

## 2018-06-12 DIAGNOSIS — R97.20 ELEVATED PSA: ICD-10-CM

## 2018-06-12 DIAGNOSIS — N40.1 BENIGN PROSTATIC HYPERPLASIA WITH URINARY OBSTRUCTION: ICD-10-CM

## 2018-06-12 DIAGNOSIS — N13.8 BENIGN PROSTATIC HYPERPLASIA WITH URINARY OBSTRUCTION: ICD-10-CM

## 2018-06-12 DIAGNOSIS — E78.6 HDL DEFICIENCY: ICD-10-CM

## 2018-06-12 DIAGNOSIS — Z00.00 MEDICARE ANNUAL WELLNESS VISIT, SUBSEQUENT: Primary | ICD-10-CM

## 2018-06-12 DIAGNOSIS — H61.23 BILATERAL IMPACTED CERUMEN: ICD-10-CM

## 2018-06-12 DIAGNOSIS — Z91.89 10 YEAR RISK OF MI OR STROKE 7.5% OR GREATER: ICD-10-CM

## 2018-06-12 DIAGNOSIS — N18.30 CKD (CHRONIC KIDNEY DISEASE) STAGE 3, GFR 30-59 ML/MIN (H): ICD-10-CM

## 2018-06-12 PROCEDURE — 69209 REMOVE IMPACTED EAR WAX UNI: CPT | Mod: 50 | Performed by: FAMILY MEDICINE

## 2018-06-12 PROCEDURE — G0439 PPPS, SUBSEQ VISIT: HCPCS | Performed by: FAMILY MEDICINE

## 2018-06-12 ASSESSMENT — ACTIVITIES OF DAILY LIVING (ADL)
I_NEED_ASSISTANCE_FOR_THE_FOLLOWING_DAILY_ACTIVITIES:: NO ASSISTANCE IS NEEDED
CURRENT_FUNCTION: NO ASSISTANCE NEEDED

## 2018-06-12 ASSESSMENT — PAIN SCALES - GENERAL: PAINLEVEL: NO PAIN (0)

## 2018-06-12 NOTE — MR AVS SNAPSHOT
After Visit Summary   6/12/2018    Roman Calero    MRN: [id number]           Patient Information     Date Of Birth          1945        Visit Information        Provider Department      6/12/2018 11:45 AM Keith Willson MD [facility]        Today's Diagnoses     Medicare annual wellness visit, subsequent    -  1    Benign prostatic hyperplasia with urinary obstruction        CKD (chronic kidney disease) stage 3, GFR 30-59 ml/min        Elevated PSA        10 year risk of MI or stroke 7.5% or greater, 12.9% in December 2015 on Simvastatin 20        Coronary artery disease involving native coronary artery of native heart without angina pectoris        HDL deficiency        Bilateral impacted cerumen          Care Instructions      Preventive Health Recommendations:       Male Ages 65 and over    Yearly exam:             See your health care provider every year in order to  o   Review health changes.   o   Discuss preventive care.    o   Review your medicines if your doctor has prescribed any.    Talk with your health care provider about whether you should have a test to screen for prostate cancer (PSA).    Every 3 years, have a diabetes test (fasting glucose). If you are at risk for diabetes, you should have this test more often.    Every 5 years, have a cholesterol test. Have this test more often if you are at risk for high cholesterol or heart disease.     Every 10 years, have a colonoscopy. Or, have a yearly FIT test (stool test). These exams will check for colon cancer.    Talk to with your health care provider about screening for Abdominal Aortic Aneurysm if you have a family history of AAA or have a history of smoking.  Shots:     Get a flu shot each year.     Get a tetanus shot every 10 years.     Talk to your doctor about your pneumonia vaccines. There are now two you should receive - Pneumovax (PPSV 23) and Prevnar (PCV 13).    Talk to your doctor about a shingles  vaccine.     Talk to your doctor about the hepatitis B vaccine.  Nutrition:     Eat at least 5 servings of fruits and vegetables each day.     Eat whole-grain bread, whole-wheat pasta and brown rice instead of white grains and rice.     Talk to your doctor about Calcium and Vitamin D.   Lifestyle    Exercise for at least 150 minutes a week (30 minutes a day, 5 days a week). This will help you control your weight and prevent disease.     Limit alcohol to one drink per day.     No smoking.     Wear sunscreen to prevent skin cancer.     See your dentist every six months for an exam and cleaning.     See your eye doctor every 1 to 2 years to screen for conditions such as glaucoma, macular degeneration and cataracts.      Please get us a copy of your Advanced Health Care Directive    You are due for a colonoscopy in January 209. Please schedule that and get us the records faxed to [phone]    You are a candidate to get the Shingrix vaccination(s)           Follow-ups after your visit        Follow-up notes from your care team     Return in about 1 year (around 6/12/2019) for Physical Exam.      Who to contact     If you have questions or need follow up information about today's clinic visit or your schedule please contact [facility] directly at [phone].  Normal or non-critical lab and imaging results will be communicated to you by MyChart, letter or phone within 4 business days after the clinic has received the results. If you do not hear from us within 7 days, please contact the clinic through [employer] or phone. If you have a critical or abnormal lab result, we will notify you by phone as soon as possible.  Submit refill requests through [employer] or call your pharmacy and they will forward the refill request to us. Please allow 3 business days for your refill to be completed.          Additional Information About Your Visit        [employer] Information     [employer] gives you secure access to your  "electronic health record. If you see a primary care provider, you can also send messages to your care team and make appointments. If you have questions, please call your primary care clinic.  If you do not have a primary care provider, please call [phone] and they will assist you.        Care EveryWhere ID     This is your Care EveryWhere ID. This could be used by other organizations to access your [place] medical records  [id number]        Your Vitals Were     Pulse Temperature Respirations Height Pulse Oximetry BMI (Body Mass Index)    56 97  F (36.1  C) (Oral) 20 6' 1\" (1.854 m) 98% 27.97 kg/m2       Blood Pressure from Last 3 Encounters:   06/12/18 113/63   05/22/17 118/76   10/06/16 108/60    Weight from Last 3 Encounters:   06/12/18 212 lb (96.2 kg)   05/22/17 210 lb (95.3 kg)   10/06/16 216 lb (98 kg)              We Performed the Following     PAF COMPLETED     REMOVE IMPACTED CERUMEN          Today's Medication Changes          These changes are accurate as of 6/12/18 12:34 PM.  If you have any questions, ask your nurse or doctor.               Stop taking these medicines if you haven't already. Please contact your care team if you have questions.     STOOL SOFTENER LAXATIVE PO   Stopped by:  Keith Willson MD                    Primary Care Provider Office Phone # Fax #    Keith Willson [phone] [address] [facility] [address]        Equal Access to Services     [facility] : Hadii aad ku hadasho Soomaali, waaxda luqadaha, qaybta kaalmada amberlyyamaryanne, curt frank . So [facility] [phone].    ATENCIÓN: Si habla español, tiene a lozano disposición servicios gratuitos de asistencia lingüística. Llame al [phone].    We comply with applicable federal civil rights laws and [place] laws. We do not discriminate on the basis of race, color, national origin, age, disability, sex, sexual orientation, or gender identity.            Thank " you!     Thank you for choosing [facility]  for your care. Our goal is always to provide you with excellent care. Hearing back from our patients is one way we can continue to improve our services. Please take a few minutes to complete the written survey that you may receive in the mail after your visit with us. Thank you!             Your Updated Medication List - Protect others around you: Learn how to safely use, store and throw away your medicines at www.disposemymeds.org.          This list is accurate as of 6/12/18 12:34 PM.  Always use your most recent med list.                   Brand Name Dispense Instructions for use Diagnosis    aspirin 81 MG tablet      Take 1 tablet by mouth daily.        CLARITIN 10 MG tablet   Generic drug:  loratadine      Take 10 mg by mouth daily        diphenhydrAMINE-acetaminophen  MG tablet    TYLENOL PM     Take by mouth nightly as needed for sleep        Fiber 625 MG tablet      Take 1,350 mg by mouth daily        FIBER 7 PO      Take 2 tablets by mouth daily.        LIPITOR 40 MG tablet   Generic drug:  atorvastatin      Take 40 mg by mouth daily ADDIS.        lisinopril 2.5 MG tablet    PRINIVIL/Zestril     Take 2.5 mg by mouth daily        metoprolol tartrate 25 MG tablet    LOPRESSOR     Take 0.25 mg by mouth 2 times daily        NITROSTAT 0.4 MG sublingual tablet   Generic drug:  nitroGLYcerin      Place 0.4 mg under the tongue every 5 minutes as needed for chest pain if you are still having symptoms after 3 doses (15 minutes) call 911.        PLAVIX 75 MG tablet   Generic drug:  clopidogrel      Take 75 mg by mouth daily        VITRUM 50+ SENIOR MULTI Tabs      Take 1 tablet by mouth daily

## 2018-06-12 NOTE — PROGRESS NOTES
SUBJECTIVE:   Roman Calero is a 73 year old male who presents for Preventive Visit.      Are you in the first 12 months of your Medicare coverage?  No    Physical   Annual:     Getting at least 3 servings of Calcium per day::  Yes    Bi-annual eye exam::  Yes    Dental care twice a year::  Yes    Sleep apnea or symptoms of sleep apnea::  None    Diet::  Low salt and Low fat/cholesterol    Taking medications regularly::  Yes    Additional concerns today::  No    Ability to successfully perform activities of daily living: no assistance needed  Home Safety:  Throw rugs in the hallway and lack of grab bars in the bathroom  Hearing Impairment: difficulty following a conversation in a noisy restaurant or crowded room, feel that people are mumbling or not speaking clearly, difficulty following dialogue in the theater, difficult to understand a speaker at a public meeting or Faith service and need to ask people to speak up or repeat themselves      Fall risk:       COGNITIVE SCREEN  1) Repeat 3 items (Banana, Sunrise, Chair)    2) Clock draw: NORMAL  3) 3 item recall: Recalls 3 objects  Results: 3 items recalled: COGNITIVE IMPAIRMENT LESS LIKELY    Mini-CogTM Copyright ZAFAR Roca. Licensed by the author for use in [facility]; reprinted with permission (eben@[facility]). All rights reserved.        Reviewed and updated as needed this visit by clinical staff         Reviewed and updated as needed this visit by Provider        Social History   Substance Use Topics     Smoking status: Never Smoker     Smokeless tobacco: Never Used     Alcohol use Yes      Comment: occ       Alcohol Use 6/12/2018   If you drink alcohol do you typically have greater than 3 drinks per day OR greater than 7 drinks per week? No   No flowsheet data found.            Today's PHQ-2 Score:   PHQ-2 ( 1999 [employer]) 6/12/2018   Q1: Little interest or pleasure in doing things 0   Q2: Feeling down, depressed or hopeless 0   PHQ-2 Score 0   Q1:  "Little interest or pleasure in doing things Not at all   Q2: Feeling down, depressed or hopeless Not at all   PHQ-2 Score 0       Do you feel safe in your environment - Yes    Do you have a Health Care Directive?: Yes: Patient states has Advance Directive and will bring in a copy to clinic.    Current providers sharing in care for this patient include:   Patient Care Team:  Keith Willson MD as PCP - General (Family Practice)    The following health maintenance items are reviewed in Epic and correct as of today:  Health Maintenance   Topic Date Due     FALL RISK ASSESSMENT  09/21/2017     ADVANCE DIRECTIVE PLANNING Q5 YRS  10/19/2017     COLON CANCER SCREEN (SYSTEM ASSIGNED)  04/01/2019     BMP Q1 YR  05/14/2019     HEMOGLOBIN Q1 YR  05/14/2019     LIPID MONITORING Q1 YEAR  05/14/2019     MICROALBUMIN Q1 YEAR  05/14/2019     TETANUS IMMUNIZATION (SYSTEM ASSIGNED)  10/28/2023     PNEUMOCOCCAL  Completed     INFLUENZA VACCINE  Completed     AORTIC ANEURYSM SCREENING (SYSTEM ASSIGNED)  Completed     HEPATITIS C SCREENING  Completed             Review of Systems      OBJECTIVE:   There were no vitals taken for this visit. Estimated body mass index is 27.33 kg/(m^2) as calculated from the following:    Height as of 5/22/17: 6' 1.5\" (1.867 m).    Weight as of 5/22/17: 210 lb (95.3 kg).  Physical Exam      ASSESSMENT / PLAN:       ICD-10-CM    1. Medicare annual wellness visit, subsequent Z00.00    2. Benign prostatic hyperplasia with urinary obstruction N40.1     N13.8    3. CKD (chronic kidney disease) stage 3, GFR 30-59 ml/min N18.3    4. Elevated PSA R97.20    5. 10 year risk of MI or stroke 7.5% or greater, 12.9% in December 2015 on Simvastatin 20 Z91.89    6. Coronary artery disease involving native coronary artery of native heart without angina pectoris I25.10    7. HDL deficiency E78.6    8. Bilateral impacted cerumen H61.23 REMOVE IMPACTED CERUMEN       End of Life Planning:  Patient currently has an advanced " "directive: Yes.  Practitioner is supportive of decision.    COUNSELING:  Reviewed preventive health counseling, as reflected in patient instructions       Regular exercise       Healthy diet/nutrition       Vision screening       Dental care       Immunizations    Declined: Shingrix due to Other he wants to think about it               Colon cancer screening       Prostate cancer screening       Osteoporosis Prevention/Bone Health        Estimated body mass index is 27.33 kg/(m^2) as calculated from the following:    Height as of 5/22/17: 6' 1.5\" (1.867 m).    Weight as of 5/22/17: 210 lb (95.3 kg).     reports that he has never smoked. He has never used smokeless tobacco.      Appropriate preventive services were discussed with this patient, including applicable screening as appropriate for cardiovascular disease, diabetes, osteopenia/osteoporosis, and glaucoma.  As appropriate for age/gender, discussed screening for colorectal cancer, prostate cancer, breast cancer, and cervical cancer. Checklist reviewing preventive services available has been given to the patient.    Reviewed patients plan of care and provided an AVS. The Basic Care Plan (routine screening as documented in Health Maintenance) for Roman meets the Care Plan requirement. This Care Plan has been established and reviewed with the Patient.    Counseling Resources:  ATP IV Guidelines  Pooled Cohorts Equation Calculator  Breast Cancer Risk Calculator  FRAX Risk Assessment  ICSI Preventive Guidelines  Dietary Guidelines for Americans, 2010  [employer]'s MyPlate  ASA Prophylaxis  Lung CA Screening    Keith Willson MD  [facility]  --------------------------------------------------------------------------------------------------------------------------------------    SUBJECTIVE:  Roman Calero is a 73 year old male who presents to the clinic today for a routine physical exam.    The patient's last physical was 1 years ago.     Cholesterol "   Date Value Ref Range Status   05/14/2018 129 <200 mg/dL Final   05/12/2017 125 <200 mg/dL Final     HDL Cholesterol   Date Value Ref Range Status   05/14/2018 46 >39 mg/dL Final   05/12/2017 52 >39 mg/dL Final     LDL Cholesterol Calculated   Date Value Ref Range Status   05/14/2018 59 <100 mg/dL Final     Comment:     Desirable:       <100 mg/dl   05/12/2017 52 <100 mg/dL Final     Comment:     Desirable:       <100 mg/dl     Triglycerides   Date Value Ref Range Status   05/14/2018 118 <150 mg/dL Final     Comment:     Fasting specimen   05/12/2017 104 <150 mg/dL Final     Comment:     Fasting specimen     Cholesterol/HDL Ratio   Date Value Ref Range Status   11/17/2014 3.5 0.0 - 5.0 Final   10/28/2013 4.1 0.0 - 5.0 Final     The patient's last fasting lipid panel was done 1 months ago and the results are listed above.        The ASCVD Risk score (Rakel MARLENE Jr, et al., 2013) failed to calculate for the following reasons:    The valid total cholesterol range is 130 to 320 mg/dL        The patient reports that he has been treated for high blood pressure.    The patient reports that he does take a daily aspirin.    Lab Results   Component Value Date    HCVAB Negative 10/28/2013     The patient reports that he has been screened for Hepatitis C    (Screen all baby boomers once per CDC-- the generation born from 1945 through 1965)    Immunization History   Administered Date(s) Administered     Influenza (High Dose) 3 valent vaccine 10/28/2013, 09/21/2016, 10/18/2017     Influenza (IIV3) PF 11/14/2014     Pneumo Conj 13-V (2010&after) 05/22/2017     Pneumococcal 23 valent 11/17/2014     TD (ADULT, 7+) 01/01/2009     TDAP Vaccine (Adacel) 10/28/2013     Zoster vaccine, live 11/01/2014     The patient's believes that his last tetanus shot was given 5 year(s) ago.   The patient believes that he has not had a Shingrix in the past  The patient believes that he has had a PPSV23 in the past.  The patient believes that he has had  a PCV13 in the past.  The patient believes that he has had a seasonal flu vaccination this fall or winter.  The patient would like to have a no vaccinations today      No results found for this or any previous visit.]   The patient denies a family history of colon cancer.  The patient reports that he has had a colonoscopy. His  last colonoscopy was in 2009 and he  report that is was normal. The patient was told to have this repeated in 10 years. He wasn't to have it done at a place in [place]. He is going to arrange that and I recommend(ed) that he have it done in January 2019.      The patient reports that he and his wife or partner are not using contraception as she has gone through menopause.    The patient denies a family history of diabetes.  The patient denies a family history of prostate cancer. He saw his Urologist Dr Delcid, and had a PSA that was stable.    The patient reports that he eats or drinks 3 servings of dairy products per day. He does take a multivitamin with calcium once daily.  The patient reports that he has dental appointments approximately every 6 months.  The patient reports that he  has an eye examination approximately every 2 year(s).    Do you currently smoke? No  How many years have you smoked? 0   How many packs per day did you smoke on average? N/A  (if more than 30 pack year history and the patient is age 55-80 consider ordering an annual low dose radiation lung CT to screen for cancer)  (Do not order if patient has quit more than 15 years ago or has a health condition that limits life expectancy or could not tolerate curative lung surgery)  Are you interested having a lung CT to screen for lung cancer? N/A    If the patient has smoked more that 100 cigarettes, has the patient had an imaging study (US or CT) for an AAA between the ages of 65 and 75? N/A              Patient Active Problem List   Diagnosis     HDL deficiency     Elevated PSA     Trigger thumb of left hand      Hyperlipidemia LDL goal <130     Benign prostatic hyperplasia with urinary obstruction     Encounter for counseling     10 year risk of MI or stroke 7.5% or greater, 12.9% in December 2015 on Simvastatin 20     Coronary artery disease involving native coronary artery of native heart without angina pectoris     AMI (acute mesenteric ischemia) (H)     CKD (chronic kidney disease) stage 3, GFR 30-59 ml/min       History reviewed. No pertinent surgical history.    Family History   Problem Relation Age of Onset     Hypertension Mother      Lipids Mother      Dementia Mother      CANCER Father      Hodgkin's age 63       Social History     Social History     Marital status:      Spouse name: N/A     Number of children: N/A     Years of education: N/A     Occupational History     Not on file.     Social History Main Topics     Smoking status: Never Smoker     Smokeless tobacco: Never Used     Alcohol use Yes      Comment: occ     Drug use: No     Sexual activity: Yes     Partners: Female     Other Topics Concern     Not on file     Social History Narrative       Current Outpatient Prescriptions   Medication Sig Dispense Refill     aspirin 81 MG tablet Take 1 tablet by mouth daily.       atorvastatin (LIPITOR) 40 MG tablet Take 40 mg by mouth daily ADDIS.       Calcium Polycarbophil (FIBER) 625 MG tablet Take 1,350 mg by mouth daily       clopidogrel (PLAVIX) 75 MG tablet Take 75 mg by mouth daily       diphenhydrAMINE-acetaminophen (TYLENOL PM)  MG tablet Take by mouth nightly as needed for sleep       lisinopril (PRINIVIL,ZESTRIL) 2.5 MG tablet Take 2.5 mg by mouth daily        loratadine (CLARITIN) 10 MG tablet Take 10 mg by mouth daily       metoprolol (LOPRESSOR) 25 MG tablet Take 0.25 mg by mouth 2 times daily        Misc Natural Products (FIBER 7 PO) Take 2 tablets by mouth daily.       Multiple Vitamins-Minerals (VITRUM 50+ SENIOR MULTI) TABS Take 1 tablet by mouth daily       nitroglycerin (NITROSTAT)  "0.4 MG SL tablet Place 0.4 mg under the tongue every 5 minutes as needed for chest pain if you are still having symptoms after 3 doses (15 minutes) call 911.         PHYSICAL EXAMINATION:  Blood pressure 113/63, pulse 56, temperature 97  F (36.1  C), temperature source Oral, resp. rate 20, height 6' 1\" (1.854 m), weight 212 lb (96.2 kg), SpO2 98 %.  General appearance - healthy, alert and no distress  Skin - Skin color, texture, turgor normal. No rashes or lesions.  Head - Normocephalic. No masses, lesions, tenderness or abnormalities  Eyes - conjunctivae/corneas clear. PERRL, EOM's intact. Fundi benign  Ears - External ears normal. Canals clear. TM's normal., positive findings: cerumen bilaterally  Nose/Sinuses - Nares normal. Septum midline. Mucosa normal. No drainage or sinus tenderness.  Oropharynx - Lips, mucosa, and tongue normal. Teeth and gums normal.  Neck - Neck supple. No adenopathy. Thyroid symmetric, normal size,  Lungs - Percussion normal. Good diaphragmatic excursion. Lungs clear  Heart - PMI normal. No lifts, heaves, or thrills. RRR. No murmurs, clicks gallops or rub  Abdomen - Abdomen soft, non-tender. BS normal. No masses, organomegaly  Extremities - Extremities normal. No deformities, edema, or skin discoloration.  Musculoskeletal - Spine ROM normal. Muscular strength intact.  Peripheral pulses - radial=4/4, femoral=4/4, popliteal=4/4, dorsalis pedis=4/4,  Neuro - Gait normal. Reflexes normal and symmetric. Sensation grossly WNL.  Genitalia - Penis normal. No urethral discharge. Scrotum normal to palpation. No hernia.  Rectal - declined by the patient as he just had it done at his urology appointment(s)       Orders Only on 05/14/2018   Component Date Value Ref Range Status     PSA 05/14/2018 11.20* 0 - 4 ug/L Final    Assay Method:  Chemiluminescence using Siemens Vista analyzer     Cholesterol 05/14/2018 129  <200 mg/dL Final     Triglycerides 05/14/2018 118  <150 mg/dL Final    Fasting specimen "     HDL Cholesterol 05/14/2018 46  >39 mg/dL Final     LDL Cholesterol Calculated 05/14/2018 59  <100 mg/dL Final    Desirable:       <100 mg/dl     Non HDL Cholesterol 05/14/2018 83  <130 mg/dL Final     Creatinine Urine 05/14/2018 134  mg/dL Final     Albumin Urine mg/L 05/14/2018 15  mg/L Final     Albumin Urine mg/g Cr 05/14/2018 11.49  0 - 17 mg/g Cr Final     Sodium 05/14/2018 142  133 - 144 mmol/L Final     Potassium 05/14/2018 4.9  3.4 - 5.3 mmol/L Final     Chloride 05/14/2018 109  94 - 109 mmol/L Final     Carbon Dioxide 05/14/2018 27  20 - 32 mmol/L Final     Anion Gap 05/14/2018 6  3 - 14 mmol/L Final     Glucose 05/14/2018 85  70 - 99 mg/dL Final    Fasting specimen     Urea Nitrogen 05/14/2018 27  7 - 30 mg/dL Final     Creatinine 05/14/2018 1.34* 0.66 - 1.25 mg/dL Final     GFR Estimate 05/14/2018 53* >60 mL/min/1.7m2 Final    Non  GFR Calc     GFR Estimate If Black 05/14/2018 64  >60 mL/min/1.7m2 Final    African American GFR Calc     Calcium 05/14/2018 8.8  8.5 - 10.1 mg/dL Final     Bilirubin Total 05/14/2018 0.7  0.2 - 1.3 mg/dL Final     Albumin 05/14/2018 3.6  3.4 - 5.0 g/dL Final     Protein Total 05/14/2018 7.4  6.8 - 8.8 g/dL Final     Alkaline Phosphatase 05/14/2018 69  40 - 150 U/L Final     ALT 05/14/2018 31  0 - 70 U/L Final     AST 05/14/2018 23  0 - 45 U/L Final     Hemoglobin 05/14/2018 14.9  13.3 - 17.7 g/dL Final     Parathyroid Hormone Intact 05/14/2018 47  18 - 80 pg/mL Final       ASSESSMENT:    ICD-10-CM    1. Medicare annual wellness visit, subsequent Z00.00        Well-Adult Physical Exam.  Health Maintenance Due   Topic Date Due     FALL RISK ASSESSMENT  09/21/2017     Health Maintenance   Topic Date Due     FALL RISK ASSESSMENT  09/21/2017     COLON CANCER SCREEN (SYSTEM ASSIGNED)  04/01/2019     BMP Q1 YR  05/14/2019     HEMOGLOBIN Q1 YR  05/14/2019     LIPID MONITORING Q1 YEAR  05/14/2019     MICROALBUMIN Q1 YEAR  05/14/2019     ADVANCE DIRECTIVE PLANNING Q5  YRS  06/12/2023     TETANUS IMMUNIZATION (SYSTEM ASSIGNED)  10/28/2023     PNEUMOCOCCAL  Completed     INFLUENZA VACCINE  Completed     AORTIC ANEURYSM SCREENING (SYSTEM ASSIGNED)  Completed     HEPATITIS C SCREENING  Completed         HEALTH CARE MAINTENENCE: The recommended screening tests and vaccinatons for this patient have been discussed as above.  The appropriate tests and vaccinations  have been ordered or declined by the patient. Please see the orders in EPIC.The patient specifically declines: Shingrix    Immunization Status:  up to date and documented except for Shingrix    Patient Active Problem List   Diagnosis     HDL deficiency     Elevated PSA     Trigger thumb of left hand     Hyperlipidemia LDL goal <130     Benign prostatic hyperplasia with urinary obstruction     Encounter for counseling     10 year risk of MI or stroke 7.5% or greater, 12.9% in December 2015 on Simvastatin 20     Coronary artery disease involving native coronary artery of native heart without angina pectoris     AMI (acute mesenteric ischemia) (H)     CKD (chronic kidney disease) stage 3, GFR 30-59 ml/min        ATP III Guidelines  ICSI Preventive Guidelines    PLAN:   I recommended continuing to take a daily aspirin (81 to 325 mg)  Shingrix recommended  Colonoscopy recommended  Discussed calcium intake, vitamins and supplements. Recommended 1000 mg of calcium daily  Sunscreen use was recommended especially in the area of tatoos  Refills on chronic medication given  Recommended dental exams every 6 months  Recommended eye exam every 1-2 years  Follow up in 1 year for the next preventative medical visit      The patient reported that he did not need any refills at this time as his cardiologist fills them all.     Body mass index is 27.97 kg/(m^2).          (N40.1,  N13.8) Benign prostatic hyperplasia with urinary obstruction  Comment:   Plan: the patient is seeing urology and they have decided to wait on any treatment at this  time.    (R97.20) Elevated PSA  Comment:   Plan: per urology     His ears were extensively filled with cerumen.  This was lavaged and I reexamine the ears which appeared normal.  He is recommended to avoid using any Q-tips in the ears and recommended only to use mineral oil once a week before showers.

## 2018-06-12 NOTE — PATIENT INSTRUCTIONS
Preventive Health Recommendations:       Male Ages 65 and over    Yearly exam:             See your health care provider every year in order to  o   Review health changes.   o   Discuss preventive care.    o   Review your medicines if your doctor has prescribed any.    Talk with your health care provider about whether you should have a test to screen for prostate cancer (PSA).    Every 3 years, have a diabetes test (fasting glucose). If you are at risk for diabetes, you should have this test more often.    Every 5 years, have a cholesterol test. Have this test more often if you are at risk for high cholesterol or heart disease.     Every 10 years, have a colonoscopy. Or, have a yearly FIT test (stool test). These exams will check for colon cancer.    Talk to with your health care provider about screening for Abdominal Aortic Aneurysm if you have a family history of AAA or have a history of smoking.  Shots:     Get a flu shot each year.     Get a tetanus shot every 10 years.     Talk to your doctor about your pneumonia vaccines. There are now two you should receive - Pneumovax (PPSV 23) and Prevnar (PCV 13).    Talk to your doctor about a shingles vaccine.     Talk to your doctor about the hepatitis B vaccine.  Nutrition:     Eat at least 5 servings of fruits and vegetables each day.     Eat whole-grain bread, whole-wheat pasta and brown rice instead of white grains and rice.     Talk to your doctor about Calcium and Vitamin D.   Lifestyle    Exercise for at least 150 minutes a week (30 minutes a day, 5 days a week). This will help you control your weight and prevent disease.     Limit alcohol to one drink per day.     No smoking.     Wear sunscreen to prevent skin cancer.     See your dentist every six months for an exam and cleaning.     See your eye doctor every 1 to 2 years to screen for conditions such as glaucoma, macular degeneration and cataracts.      Please get us a copy of your [facility]  Directive    You are due for a colonoscopy in January 209. Please schedule that and get us the records faxed to [phone]    You are a candidate to get the Shingrix vaccination(s)

## 2018-08-06 ENCOUNTER — TRANSFERRED RECORDS (OUTPATIENT)
Dept: HEALTH INFORMATION MANAGEMENT | Facility: CLINIC | Age: 73
End: 2018-08-06

## 2018-09-17 ENCOUNTER — TRANSFERRED RECORDS (OUTPATIENT)
Dept: HEALTH INFORMATION MANAGEMENT | Facility: CLINIC | Age: 73
End: 2018-09-17

## 2018-09-24 ENCOUNTER — TRANSFERRED RECORDS (OUTPATIENT)
Dept: HEALTH INFORMATION MANAGEMENT | Facility: CLINIC | Age: 73
End: 2018-09-24

## 2018-12-03 ENCOUNTER — TELEPHONE (OUTPATIENT)
Dept: FAMILY MEDICINE | Facility: CLINIC | Age: 73
End: 2018-12-03

## 2018-12-03 NOTE — TELEPHONE ENCOUNTER
Reason for Call:  Form, our goal is to have forms completed with 72 hours, however, some forms may require a visit or additional information.    Type of letter, form or note:  medical    Who is the form from?: Patient    Where did the form come from: Patient or family brought in       What clinic location was the form placed at?: [place]    Where the form was placed: 's Box    What number is listed as a contact on the form?: [phone]       Additional comments: Patient brought forms for biometric screening information, for their insurance. They attached a self addressed envelope to mail forms when done. Thank you    Call taken on 12/3/2018 at 2:04 PM by Kym Naylor

## 2018-12-04 NOTE — TELEPHONE ENCOUNTER
[facility][phone].  I spoke to the patients wife and let her know.  Neeru Ramírez,

## 2019-05-28 ENCOUNTER — OFFICE VISIT (OUTPATIENT)
Dept: FAMILY MEDICINE | Facility: CLINIC | Age: 74
End: 2019-05-28
Payer: COMMERCIAL

## 2019-05-28 VITALS
SYSTOLIC BLOOD PRESSURE: 115 MMHG | TEMPERATURE: 98.1 F | WEIGHT: 219 LBS | DIASTOLIC BLOOD PRESSURE: 77 MMHG | HEART RATE: 58 BPM | BODY MASS INDEX: 28.89 KG/M2 | OXYGEN SATURATION: 100 % | RESPIRATION RATE: 15 BRPM

## 2019-05-28 DIAGNOSIS — H61.23 BILATERAL IMPACTED CERUMEN: Primary | ICD-10-CM

## 2019-05-28 PROCEDURE — 99212 OFFICE O/P EST SF 10 MIN: CPT | Mod: 25 | Performed by: INTERNAL MEDICINE

## 2019-05-28 PROCEDURE — 69209 REMOVE IMPACTED EAR WAX UNI: CPT | Mod: 50 | Performed by: INTERNAL MEDICINE

## 2019-05-28 ASSESSMENT — PAIN SCALES - GENERAL: PAINLEVEL: NO PAIN (0)

## 2019-05-28 NOTE — PROGRESS NOTES
SUBJECTIVE:  Roman Calero is an 74 year old male who presents for ear wax.  Does wear hearing aids the past couple years, but has had ear wax build up before that too.  No ear pain.  No changes in hearing.  No fevers.  No ear drainage. Not use qtips.      PMH:  Patient Active Problem List   Diagnosis     HDL deficiency     Elevated PSA     Trigger thumb of left hand     Hyperlipidemia LDL goal <130     Benign prostatic hyperplasia with urinary obstruction     Encounter for counseling     10 year risk of MI or stroke 7.5% or greater, 12.9% in December 2015 on Simvastatin 20     Coronary artery disease involving native coronary artery of native heart without angina pectoris     AMI (acute mesenteric ischemia) (H)     CKD (chronic kidney disease) stage 3, GFR 30-59 ml/min (H)     Social History     Socioeconomic History     Marital status:      Spouse name: None     Number of children: None     Years of education: None     Highest education level: None   Occupational History     None   Social Needs     Financial resource strain: None     Food insecurity:     Worry: None     Inability: None     Transportation needs:     Medical: None     Non-medical: None   Tobacco Use     Smoking status: Never Smoker     Smokeless tobacco: Never Used   Substance and Sexual Activity     Alcohol use: Yes     Comment: occ     Drug use: No     Sexual activity: Yes     Partners: Female   Lifestyle     Physical activity:     Days per week: None     Minutes per session: None     Stress: None   Relationships     Social connections:     Talks on phone: None     Gets together: None     Attends Mosque service: None     Active member of club or organization: None     Attends meetings of clubs or organizations: None     Relationship status: None     Intimate partner violence:     Fear of current or ex partner: None     Emotionally abused: None     Physically abused: None     Forced sexual activity: None   Other Topics Concern      Parent/sibling w/ CABG, MI or angioplasty before 65F 55M? Not Asked   Social History Narrative     None     Family History   Problem Relation Age of Onset     Hypertension Mother      Lipids Mother      Dementia Mother      Cancer Father         Hodgkin's age 63       ALLERGIES:  Patient has no known allergies.    Current Outpatient Medications   Medication     aspirin 81 MG tablet     atorvastatin (LIPITOR) 40 MG tablet     Calcium Polycarbophil (FIBER) 625 MG tablet     clopidogrel (PLAVIX) 75 MG tablet     diphenhydrAMINE-acetaminophen (TYLENOL PM)  MG tablet     lisinopril (PRINIVIL,ZESTRIL) 2.5 MG tablet     loratadine (CLARITIN) 10 MG tablet     metoprolol (LOPRESSOR) 25 MG tablet     Misc Natural Products (FIBER 7 PO)     Multiple Vitamins-Minerals (VITRUM 50+ SENIOR MULTI) TABS     nitroglycerin (NITROSTAT) 0.4 MG SL tablet     No current facility-administered medications for this visit.          ROS:  ROS is done and is negative for general/constitutional, eye, ENT, Respiratory, cardiovascular, GI, , Skin, musculoskeletal except as noted elsewhere.  All other review of systems negative except as noted elsewhere.      OBJECTIVE:  /77   Pulse 58   Temp 98.1  F (36.7  C) (Oral)   Resp 15   Wt 99.3 kg (219 lb)   SpO2 100%   BMI 28.89 kg/m    GENERAL APPEARANCE: Alert, in no acute distress  EYES: normal  EARS: External ears normal. Canals initially with moderate cerumen build up obstructing view of TMs, but after removal of cerumen TMs are visualized. TM's normal.  NOSE:normal  OROPHARYNX:normal  NECK:No adenopathy,masses or thyromegaly  RESP: normal and clear to auscultation  CV:regular rate and rhythm and no murmurs, clicks, or gallops    PROCEDURE:  Cerumen removed from bilateral ear canals with lavage done by MA supervised by me.    ASSESSMENT/PLAN:    ASSESSMENT / PLAN:  (H61.23) Bilateral impacted cerumen  (primary encounter diagnosis)  Comment: cerumen removed as above.  Currently no  sign of otitis externa or otitis media.  Plan: reviewed cerumen management.  Pt wears hearing aids which increases risk of cerumen impaction. I reviewed supportive care, otc meds to use if needed, expected course, and signs of concern.  Follow up as needed or in  1 year(s) or sooner if worsens in any way.  Reviewed red flag symptoms and is to go to the ER if experiences any of these.      See [facility] for orders, medications, letters, patient instructions    Kati Ortega M.D.

## 2019-05-28 NOTE — NURSING NOTE
"Chief Complaint   Patient presents with     Ear Problem     pt would like ears checked for wax     Health Maintenance     Zoster/Lipid/BMP/Microalbumin/Phq2       Initial /77   Pulse 58   Temp 98.1  F (36.7  C) (Oral)   Resp 15   Wt 99.3 kg (219 lb)   SpO2 100%   BMI 28.89 kg/m   Estimated body mass index is 28.89 kg/m  as calculated from the following:    Height as of 6/12/18: 1.854 m (6' 1\").    Weight as of this encounter: 99.3 kg (219 lb).  Medication Reconciliation: complete  Joe Archer MA    "

## 2019-06-04 ENCOUNTER — DOCUMENTATION ONLY (OUTPATIENT)
Dept: LAB | Facility: CLINIC | Age: 74
End: 2019-06-04

## 2019-06-04 DIAGNOSIS — N18.30 CKD (CHRONIC KIDNEY DISEASE) STAGE 3, GFR 30-59 ML/MIN (H): ICD-10-CM

## 2019-06-04 DIAGNOSIS — N40.1 BENIGN PROSTATIC HYPERPLASIA WITH URINARY OBSTRUCTION: ICD-10-CM

## 2019-06-04 DIAGNOSIS — Z91.89 10 YEAR RISK OF MI OR STROKE 7.5% OR GREATER: ICD-10-CM

## 2019-06-04 DIAGNOSIS — Z13.1 SCREENING FOR DIABETES MELLITUS: ICD-10-CM

## 2019-06-04 DIAGNOSIS — E78.5 HYPERLIPIDEMIA LDL GOAL <130: Primary | ICD-10-CM

## 2019-06-04 DIAGNOSIS — E78.6 HDL DEFICIENCY: ICD-10-CM

## 2019-06-04 DIAGNOSIS — N13.8 BENIGN PROSTATIC HYPERPLASIA WITH URINARY OBSTRUCTION: ICD-10-CM

## 2019-06-04 NOTE — PROGRESS NOTES
PLEASE REVIEW, PLACE FUTURE ORDERS OR SIGN PENDED ORDERS FOR PATIENT'S UPCOMING LAB ONLY APPOINTMENT ON 06.07.2019.    THANK YOU.   RON FUNES

## 2019-06-07 DIAGNOSIS — Z12.5 ENCOUNTER FOR SCREENING FOR MALIGNANT NEOPLASM OF PROSTATE: ICD-10-CM

## 2019-06-07 DIAGNOSIS — N18.30 CKD (CHRONIC KIDNEY DISEASE) STAGE 3, GFR 30-59 ML/MIN (H): ICD-10-CM

## 2019-06-07 DIAGNOSIS — R97.20 ELEVATED PROSTATE SPECIFIC ANTIGEN (PSA): Primary | ICD-10-CM

## 2019-06-07 DIAGNOSIS — E78.6 HDL DEFICIENCY: ICD-10-CM

## 2019-06-07 DIAGNOSIS — Z13.1 SCREENING FOR DIABETES MELLITUS: ICD-10-CM

## 2019-06-07 DIAGNOSIS — E78.5 HYPERLIPIDEMIA LDL GOAL <130: ICD-10-CM

## 2019-06-07 LAB
ALBUMIN SERPL-MCNC: 3.7 G/DL (ref 3.4–5)
ALP SERPL-CCNC: 85 U/L (ref 40–150)
ALT SERPL W P-5'-P-CCNC: 37 U/L (ref 0–70)
ANION GAP SERPL CALCULATED.3IONS-SCNC: 6 MMOL/L (ref 3–14)
AST SERPL W P-5'-P-CCNC: 25 U/L (ref 0–45)
BILIRUB SERPL-MCNC: 0.8 MG/DL (ref 0.2–1.3)
BUN SERPL-MCNC: 25 MG/DL (ref 7–30)
CALCIUM SERPL-MCNC: 8.9 MG/DL (ref 8.5–10.1)
CHLORIDE SERPL-SCNC: 110 MMOL/L (ref 94–109)
CHOLEST SERPL-MCNC: 149 MG/DL
CO2 SERPL-SCNC: 26 MMOL/L (ref 20–32)
CREAT SERPL-MCNC: 1.31 MG/DL (ref 0.66–1.25)
CREAT UR-MCNC: 165 MG/DL
GFR SERPL CREATININE-BSD FRML MDRD: 54 ML/MIN/{1.73_M2}
GLUCOSE SERPL-MCNC: 84 MG/DL (ref 70–99)
HDLC SERPL-MCNC: 47 MG/DL
HGB BLD-MCNC: 13.8 G/DL (ref 13.3–17.7)
LDLC SERPL CALC-MCNC: 84 MG/DL
MICROALBUMIN UR-MCNC: 26 MG/L
MICROALBUMIN/CREAT UR: 15.7 MG/G CR (ref 0–17)
NONHDLC SERPL-MCNC: 102 MG/DL
POTASSIUM SERPL-SCNC: 4.2 MMOL/L (ref 3.4–5.3)
PROT SERPL-MCNC: 7.2 G/DL (ref 6.8–8.8)
PSA SERPL-ACNC: 11.5 UG/L (ref 0–4)
PTH-INTACT SERPL-MCNC: 40 PG/ML (ref 18–80)
SODIUM SERPL-SCNC: 142 MMOL/L (ref 133–144)
TRIGL SERPL-MCNC: 90 MG/DL

## 2019-06-07 PROCEDURE — 84153 ASSAY OF PSA TOTAL: CPT | Performed by: UROLOGY

## 2019-06-07 PROCEDURE — 85018 HEMOGLOBIN: CPT | Performed by: FAMILY MEDICINE

## 2019-06-07 PROCEDURE — 80061 LIPID PANEL: CPT | Performed by: FAMILY MEDICINE

## 2019-06-07 PROCEDURE — 36415 COLL VENOUS BLD VENIPUNCTURE: CPT | Performed by: FAMILY MEDICINE

## 2019-06-07 PROCEDURE — 80053 COMPREHEN METABOLIC PANEL: CPT | Performed by: FAMILY MEDICINE

## 2019-06-07 PROCEDURE — 83970 ASSAY OF PARATHORMONE: CPT | Performed by: FAMILY MEDICINE

## 2019-06-07 PROCEDURE — 82043 UR ALBUMIN QUANTITATIVE: CPT | Performed by: FAMILY MEDICINE

## 2019-06-14 ENCOUNTER — OFFICE VISIT (OUTPATIENT)
Dept: FAMILY MEDICINE | Facility: CLINIC | Age: 74
End: 2019-06-14
Payer: COMMERCIAL

## 2019-06-14 VITALS
OXYGEN SATURATION: 99 % | HEIGHT: 73 IN | SYSTOLIC BLOOD PRESSURE: 118 MMHG | TEMPERATURE: 98.4 F | RESPIRATION RATE: 18 BRPM | WEIGHT: 215 LBS | BODY MASS INDEX: 28.49 KG/M2 | HEART RATE: 64 BPM | DIASTOLIC BLOOD PRESSURE: 64 MMHG

## 2019-06-14 DIAGNOSIS — R97.20 ELEVATED PSA: ICD-10-CM

## 2019-06-14 DIAGNOSIS — E78.6 HDL DEFICIENCY: ICD-10-CM

## 2019-06-14 DIAGNOSIS — N18.30 CKD (CHRONIC KIDNEY DISEASE) STAGE 3, GFR 30-59 ML/MIN (H): ICD-10-CM

## 2019-06-14 DIAGNOSIS — Z00.00 MEDICARE ANNUAL WELLNESS VISIT, SUBSEQUENT: Primary | ICD-10-CM

## 2019-06-14 DIAGNOSIS — Z12.11 SPECIAL SCREENING FOR MALIGNANT NEOPLASMS, COLON: ICD-10-CM

## 2019-06-14 DIAGNOSIS — I25.10 CORONARY ARTERY DISEASE INVOLVING NATIVE CORONARY ARTERY OF NATIVE HEART WITHOUT ANGINA PECTORIS: ICD-10-CM

## 2019-06-14 PROCEDURE — G0439 PPPS, SUBSEQ VISIT: HCPCS | Performed by: FAMILY MEDICINE

## 2019-06-14 ASSESSMENT — ENCOUNTER SYMPTOMS
CONSTIPATION: 0
ARTHRALGIAS: 0
DIZZINESS: 1
PALPITATIONS: 0
HEMATURIA: 0
DIARRHEA: 0
FEVER: 0
ABDOMINAL PAIN: 0
WEAKNESS: 1
HEARTBURN: 0
NAUSEA: 0
HEMATOCHEZIA: 0
EYE PAIN: 0
MYALGIAS: 0
JOINT SWELLING: 0
COUGH: 0
SORE THROAT: 0
PARESTHESIAS: 0
FREQUENCY: 1
SHORTNESS OF BREATH: 0
HEADACHES: 0
NERVOUS/ANXIOUS: 0
DYSURIA: 0
CHILLS: 0

## 2019-06-14 ASSESSMENT — PAIN SCALES - GENERAL: PAINLEVEL: NO PAIN (0)

## 2019-06-14 ASSESSMENT — MIFFLIN-ST. JEOR: SCORE: 1779.11

## 2019-06-14 ASSESSMENT — ACTIVITIES OF DAILY LIVING (ADL): CURRENT_FUNCTION: NO ASSISTANCE NEEDED

## 2019-06-14 NOTE — NURSING NOTE
"Chief Complaint   Patient presents with     Wellness Visit     Health Maintenance     order pended, fall risk       Initial /64   Pulse 64   Temp 98.4  F (36.9  C) (Oral)   Resp 18   Ht 1.854 m (6' 1\")   Wt 97.5 kg (215 lb)   SpO2 99%   BMI 28.37 kg/m   Estimated body mass index is 28.37 kg/m  as calculated from the following:    Height as of this encounter: 1.854 m (6' 1\").    Weight as of this encounter: 97.5 kg (215 lb).  Medication Reconciliation: complete  Latanya Tovar CMA  "

## 2019-06-14 NOTE — PATIENT INSTRUCTIONS
Patient Education   Personalized Prevention Plan  You are due for the preventive services outlined below.  Your care team is available to assist you in scheduling these services.  If you have already completed any of these items, please share that information with your care team to update in your medical record.  Health Maintenance Due   Topic Date Due     Zoster (Shingles) Vaccine (2 of 3) 02/04/2015     Colonscopy  04/01/2019     Annual Wellness Visit  06/12/2019         Please get us a copy of your  [employer] Directive       Results for orders placed or performed in visit on 06/07/19   Parathyroid Hormone Intact   Result Value Ref Range    Parathyroid Hormone Intact 40 18 - 80 pg/mL   Hemoglobin   Result Value Ref Range    Hemoglobin 13.8 13.3 - 17.7 g/dL   Comprehensive metabolic panel   Result Value Ref Range    Sodium 142 133 - 144 mmol/L    Potassium 4.2 3.4 - 5.3 mmol/L    Chloride 110 (H) 94 - 109 mmol/L    Carbon Dioxide 26 20 - 32 mmol/L    Anion Gap 6 3 - 14 mmol/L    Glucose 84 70 - 99 mg/dL    Urea Nitrogen 25 7 - 30 mg/dL    Creatinine 1.31 (H) 0.66 - 1.25 mg/dL    GFR Estimate 54 (L) >60 mL/min/[1.73_m2]    GFR Estimate If Black 63 >60 mL/min/[1.73_m2]    Calcium 8.9 8.5 - 10.1 mg/dL    Bilirubin Total 0.8 0.2 - 1.3 mg/dL    Albumin 3.7 3.4 - 5.0 g/dL    Protein Total 7.2 6.8 - 8.8 g/dL    Alkaline Phosphatase 85 40 - 150 U/L    ALT 37 0 - 70 U/L    AST 25 0 - 45 U/L   Albumin Random Urine Quantitative with Creat Ratio   Result Value Ref Range    Creatinine Urine 165 mg/dL    Albumin Urine mg/L 26 mg/L    Albumin Urine mg/g Cr 15.70 0 - 17 mg/g Cr   Lipid panel reflex to direct LDL Fasting   Result Value Ref Range    Cholesterol 149 <200 mg/dL    Triglycerides 90 <150 mg/dL    HDL Cholesterol 47 >39 mg/dL    LDL Cholesterol Calculated 84 <100 mg/dL    Non HDL Cholesterol 102 <130 mg/dL   PSA, screen   Result Value Ref Range    PSA 11.50 (H) 0 - 4 ug/L     Please follow up with Dr Davies  as planned

## 2019-06-14 NOTE — PROGRESS NOTES
"SUBJECTIVE:   Roman Calero is a 74 year old male who presents for Preventive Visit.      Are you in the first 12 months of your Medicare coverage?  No    Healthy Habits:     In general, how would you rate your overall health?  Good    Frequency of exercise:  4-5 days/week    Duration of exercise:  30-45 minutes    Do you usually eat at least 4 servings of fruit and vegetables a day, include whole grains    & fiber and avoid regularly eating high fat or \"junk\" foods?  Yes    Taking medications regularly:  No    Barriers to taking medications:  None    Medication side effects:  Lightheadedness    Ability to successfully perform activities of daily living:  No assistance needed    Home Safety:  No safety concerns identified    Hearing Impairment:  Difficulty following a conversation in a noisy restaurant or crowded room, feel that people are mumbling or not speaking clearly, difficult to understand a speaker at a public meeting or Taoism service, need to ask people to speak up or repeat themselves, find that men's voices are easier to understand than woman's, difficulty understanding soft or whispered speech and difficulty understanding speech on the telephone    In the past 6 months, have you been bothered by leaking of urine?  No    In general, how would you rate your overall mental or emotional health?  Good      PHQ-2 Total Score: 0    Additional concerns today:  No  He has hearing aids and has an appointment(s) next week to have them \"tuned up\"      Do you feel safe in your environment? Yes    Do you have a Health Care Directive? Yes: Patient states has Advance Directive and will bring in a copy to clinic.      Fall risk  Fallen 2 or more times in the past year?: No  Any fall with injury in the past year?: No    Cognitive Screening   1) Repeat 3 items (Leader, Season, Table)    2) Clock draw:   NORMAL  3) 3 item recall: Recalls 3 objects  Results: 3 items recalled: COGNITIVE IMPAIRMENT LESS " LIKELY    Mini-CogTM Copyright ZAFAR Roca. Licensed by the author for use in [employer]; reprinted with permission (eben@.[facility]). All rights reserved.      Do you have sleep apnea, excessive snoring or daytime drowsiness?: no    Reviewed and updated as needed this visit by clinical staff  Tobacco  Allergies  Meds  Med Hx  Surg Hx  Fam Hx  Soc Hx        Reviewed and updated as needed this visit by Provider        Social History     Tobacco Use     Smoking status: Never Smoker     Smokeless tobacco: Never Used   Substance Use Topics     Alcohol use: Yes     Comment: occ         Alcohol Use 6/14/2019   Prescreen: >3 drinks/day or >7 drinks/week? No   Prescreen: >3 drinks/day or >7 drinks/week? -               Current providers sharing in care for this patient include:   Patient Care Team:  Keith Willson MD as PCP - General (Family Practice)  Keith Willson MD as Assigned PCP    The following health maintenance items are reviewed in Epic and correct as of today:  Health Maintenance   Topic Date Due     ZOSTER IMMUNIZATION (2 of 3) 02/04/2015     COLONOSCOPY  04/01/2019     MEDICARE ANNUAL WELLNESS VISIT  06/12/2019     INFLUENZA VACCINE (Season Ended) 09/01/2019     BMP  06/07/2020     LIPID  06/07/2020     MICROALBUMIN  06/07/2020     ADVANCED DIRECTIVE PLANNING  06/12/2023     DTAP/TDAP/TD IMMUNIZATION (3 - Td) 10/28/2023     HEPATITIS C SCREENING  Completed     PHQ-2  Completed     PNEUMOCOCCAL IMMUNIZATION 65+ LOW/MEDIUM RISK  Completed     AORTIC ANEURYSM SCREENING (SYSTEM ASSIGNED)  Completed     IPV IMMUNIZATION  Aged Out     MENINGITIS IMMUNIZATION  Aged Out           Review of Systems   Constitutional: Negative for chills and fever.   HENT: Positive for hearing loss. Negative for congestion, ear pain and sore throat.    Eyes: Positive for visual disturbance. Negative for pain.   Respiratory: Negative for cough and shortness of breath.    Cardiovascular: Negative for chest pain,  "palpitations and peripheral edema.   Gastrointestinal: Negative for abdominal pain, constipation, diarrhea, heartburn, hematochezia and nausea.   Genitourinary: Positive for frequency and urgency. Negative for dysuria, genital sores and hematuria.   Musculoskeletal: Negative for arthralgias, joint swelling and myalgias.   Skin: Negative for rash.   Neurological: Positive for dizziness and weakness. Negative for headaches and paresthesias.   Psychiatric/Behavioral: Negative for mood changes. The patient is not nervous/anxious.          OBJECTIVE:   /64   Pulse 64   Temp 98.4  F (36.9  C) (Oral)   Resp 18   Ht 1.854 m (6' 1\")   Wt 97.5 kg (215 lb)   SpO2 99%   BMI 28.37 kg/m   Estimated body mass index is 28.37 kg/m  as calculated from the following:    Height as of this encounter: 1.854 m (6' 1\").    Weight as of this encounter: 97.5 kg (215 lb).  Physical Exam      Diagnostic Test Results:  Labs reviewed in Epic    ASSESSMENT / PLAN:       ICD-10-CM    1. Medicare annual wellness visit, subsequent Z00.00 GASTROENTEROLOGY ADULT REF PROCEDURE ONLY [facility] [phone]; No Provider Preference   2. Special screening for malignant neoplasms, colon Z12.11 GASTROENTEROLOGY ADULT REF PROCEDURE ONLY [facility] [phone]; No Provider Preference   3. CKD (chronic kidney disease) stage 3, GFR 30-59 ml/min (H) N18.3    4. Coronary artery disease involving native coronary artery of native heart without angina pectoris I25.10    5. Elevated PSA R97.20    6. 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  Practitioner is supportive of decision.    COUNSELING:  Reviewed preventive health counseling, as reflected in patient instructions       Regular exercise       Healthy diet/nutrition       Vision screening       Dental care       Bladder control       Immunizations    Vaccinated for: Zoster  At Pharmacy if available            Hepatitis C " "screening       Colon cancer screening       Osteoporosis Prevention/Bone Health    Estimated body mass index is 28.37 kg/m  as calculated from the following:    Height as of this encounter: 1.854 m (6' 1\").    Weight as of this encounter: 97.5 kg (215 lb).         reports that he has never smoked. He has never used smokeless tobacco.      Appropriate preventive services were discussed with this patient, including applicable screening as appropriate for cardiovascular disease, diabetes, osteopenia/osteoporosis, and glaucoma.  As appropriate for age/gender, discussed screening for colorectal cancer, prostate cancer, breast cancer, and cervical cancer. Checklist reviewing preventive services available has been given to the patient.    Reviewed patients plan of care and provided an AVS. The Basic Care Plan (routine screening as documented in Health Maintenance) for Roman meets the Care Plan requirement. This Care Plan has been established and reviewed with the Patient.    Counseling Resources:  ATP IV Guidelines  Pooled Cohorts Equation Calculator  Breast Cancer Risk Calculator  FRAX Risk Assessment  ICSI Preventive Guidelines  Dietary Guidelines for Americans, 2010  Cogn[employer]'s MyPlate  ASA Prophylaxis  Lung CA Screening    Keith Willson MD  [facility]    Identified Health Risks:  --------------------------------------------------------------------------------------------------------------------------------------    SUBJECTIVE:  Roman Calero is a 74 year old male who presents to the clinic today for a routine physical exam.    The patient's last physical was 1 years ago.     Cholesterol   Date Value Ref Range Status   06/07/2019 149 <200 mg/dL Final   05/14/2018 129 <200 mg/dL Final     HDL Cholesterol   Date Value Ref Range Status   06/07/2019 47 >39 mg/dL Final   05/14/2018 46 >39 mg/dL Final     LDL Cholesterol Calculated   Date Value Ref Range Status   06/07/2019 84 <100 mg/dL Final     Comment:     " Desirable:       <100 mg/dl   05/14/2018 59 <100 mg/dL Final     Comment:     Desirable:       <100 mg/dl     Triglycerides   Date Value Ref Range Status   06/07/2019 90 <150 mg/dL Final     Comment:     Fasting specimen   05/14/2018 118 <150 mg/dL Final     Comment:     Fasting specimen     Cholesterol/HDL Ratio   Date Value Ref Range Status   11/17/2014 3.5 0.0 - 5.0 Final   10/28/2013 4.1 0.0 - 5.0 Final     The patient's last fasting lipid panel was done 1 weeks ago and the results are listed above.      The 10-year ASCVD risk score (Rakel ROSARIO Jr., et al., 2013) is: 16.4%    Values used to calculate the score:      Age: 74 years      Sex: Male      Is Non- : No      Diabetic: No      Tobacco smoker: No      Systolic Blood Pressure: 118 mmHg      Is BP treated: No      HDL Cholesterol: 47 mg/dL      Total Cholesterol: 149 mg/dL        The patient reports that he has been treated for high blood pressure.    The patient reports that he does take a daily aspirin.    Lab Results   Component Value Date    HCVAB Negative 10/28/2013     The patient reports that he has not been screened for Hepatitis C    (Screen all baby boomers once per CDC-- the generation born from 1945 through 1965)  He would not like to have an Hepatitis C test today          Immunization History   Administered Date(s) Administered     Influenza (High Dose) 3 valent vaccine 10/28/2013, 09/21/2016, 10/18/2017     Influenza (IIV3) PF 11/14/2014     Pneumo Conj 13-V (2010&after) 05/22/2017     Pneumococcal 23 valent 11/17/2014     TD (ADULT, 7+) 01/01/2009     TDAP Vaccine (Adacel) 10/28/2013     Zoster vaccine recombinant adjuvanted (SHINGRIX) 02/01/2019     Zoster vaccine, live 11/01/2014     The patient's believes that his last tetanus shot was given 6 year(s) ago.   The patient believes that he has had a Shingrix #1  in the past  The patient believes that he has had a PPSV23 in the past.  The patient believes that he has had  a PCV13 in the past.  The patient believes that he has had a seasonal flu vaccination this fall or winter.  The patient would like to have a Shingrix #2      No results found for this or any previous visit.]   The patient denies a family history of colon cancer.  The patient reports that he has had a colonoscopy. His  last colonoscopy was in 2009 and he  report that is was normal. The patient was told to have this repeated in 10 years.    The patient reports that he and his wife or partner are not using contraception as she has gone through menopause.  The patient denies a family history of diabetes.  The patient denies a family history of prostate cancer. He has an annual PSA with Dr Delcid his urologist.   The patient reports that he eats or drinks 3 servings of dairy products per day. He does not take a calcium supplement at all.  The patient reports that he has dental appointments approximately every 6 months.  The patient reports that he  has an eye examination approximately every 1.0 year(s).    Do you currently smoke? No  How many years have you smoked? 0   How many packs per day did you smoke on average?N/A  (if more than 30 pack year history and the patient is age 55-80 consider ordering an annual low dose radiation lung CT to screen for cancer)  (Do not order if patient has quit more than 15 years ago or has a health condition that limits life expectancy or could not tolerate curative lung surgery)  Are you interested having a lung CT to screen for lung cancer? N/A    If the patient has smoked more that 100 cigarettes, has the patient had an imaging study (US or CT) for an AAA between the ages of 65 and 75? N/A          Patient Active Problem List   Diagnosis     HDL deficiency     Elevated PSA     Trigger thumb of left hand     Hyperlipidemia LDL goal <130     Benign prostatic hyperplasia with urinary obstruction     Encounter for counseling     10 year risk of MI or stroke 7.5% or greater, 12.9% in  December 2015 on Simvastatin 20     Coronary artery disease involving native coronary artery of native heart without angina pectoris     AMI (acute mesenteric ischemia) (H)     CKD (chronic kidney disease) stage 3, GFR 30-59 ml/min (H)       History reviewed. No pertinent surgical history.    Family History   Problem Relation Age of Onset     Hypertension Mother      Lipids Mother      Dementia Mother      Cancer Father         Hodgkin's age 63       Social History     Socioeconomic History     Marital status:      Spouse name: Not on file     Number of children: Not on file     Years of education: Not on file     Highest education level: Not on file   Occupational History     Not on file   Social Needs     Financial resource strain: Not on file     Food insecurity:     Worry: Not on file     Inability: Not on file     Transportation needs:     Medical: Not on file     Non-medical: Not on file   Tobacco Use     Smoking status: Never Smoker     Smokeless tobacco: Never Used   Substance and Sexual Activity     Alcohol use: Yes     Comment: occ     Drug use: No     Sexual activity: Yes     Partners: Female   Lifestyle     Physical activity:     Days per week: Not on file     Minutes per session: Not on file     Stress: Not on file   Relationships     Social connections:     Talks on phone: Not on file     Gets together: Not on file     Attends Faith service: Not on file     Active member of club or organization: Not on file     Attends meetings of clubs or organizations: Not on file     Relationship status: Not on file     Intimate partner violence:     Fear of current or ex partner: Not on file     Emotionally abused: Not on file     Physically abused: Not on file     Forced sexual activity: Not on file   Other Topics Concern     Parent/sibling w/ CABG, MI or angioplasty before 65F 55M? Not Asked   Social History Narrative     Not on file       Current Outpatient Medications   Medication Sig Dispense Refill  "    aspirin 81 MG tablet Take 1 tablet by mouth daily.       atorvastatin (LIPITOR) 40 MG tablet Take 40 mg by mouth daily ADDIS.       Calcium Polycarbophil (FIBER) 625 MG tablet Take 1,350 mg by mouth daily       clopidogrel (PLAVIX) 75 MG tablet Take 75 mg by mouth daily       diphenhydrAMINE-acetaminophen (TYLENOL PM)  MG tablet Take by mouth nightly as needed for sleep       lisinopril (PRINIVIL,ZESTRIL) 2.5 MG tablet Take 2.5 mg by mouth daily        loratadine (CLARITIN) 10 MG tablet Take 10 mg by mouth daily       metoprolol (LOPRESSOR) 25 MG tablet Take 0.25 mg by mouth 2 times daily        Misc Natural Products (FIBER 7 PO) Take 2 tablets by mouth daily.       Multiple Vitamins-Minerals (VITRUM 50+ SENIOR MULTI) TABS Take 1 tablet by mouth daily       nitroglycerin (NITROSTAT) 0.4 MG SL tablet Place 0.4 mg under the tongue every 5 minutes as needed for chest pain if you are still having symptoms after 3 doses (15 minutes) call 911.             PHYSICAL EXAMINATION:  Blood pressure 118/64, pulse 64, temperature 98.4  F (36.9  C), temperature source Oral, resp. rate 18, height 1.854 m (6' 1\"), weight 97.5 kg (215 lb), SpO2 99 %.  General appearance - healthy, alert and no distress  Skin - Skin color, texture, turgor normal. No rashes or lesions.  Head - Normocephalic. No masses, lesions, tenderness or abnormalities  Eyes - conjunctivae/corneas clear. PERRL, EOM's intact. Fundi benign  Ears - External ears normal. Canals clear. TM's normal.  Nose/Sinuses - Nares normal. Septum midline. Mucosa normal. No drainage or sinus tenderness.  Oropharynx - Lips, mucosa, and tongue normal. Teeth and gums normal.  Neck - Neck supple. No adenopathy. Thyroid symmetric, normal size,  Lungs - Percussion normal. Good diaphragmatic excursion. Lungs clear  Heart - PMI normal. No lifts, heaves, or thrills. RRR. No murmurs, clicks gallops or rub  Abdomen - Abdomen soft, non-tender. BS normal. No masses, " organomegaly  Extremities - Extremities normal. No deformities, edema, or skin discoloration.  Musculoskeletal - Spine ROM normal. Muscular strength intact.  Peripheral pulses - radial=4/4, femoral=4/4, popliteal=4/4, dorsalis pedis=4/4,  Neuro - Gait normal. Reflexes normal and symmetric. Sensation grossly WNL.  Genitalia - Penis normal. No urethral discharge. Scrotum normal to palpation. No hernia.  Rectal - deferred to his urologist      Orders Only on 06/07/2019   Component Date Value Ref Range Status     Parathyroid Hormone Intact 06/07/2019 40  18 - 80 pg/mL Final     Hemoglobin 06/07/2019 13.8  13.3 - 17.7 g/dL Final     Sodium 06/07/2019 142  133 - 144 mmol/L Final     Potassium 06/07/2019 4.2  3.4 - 5.3 mmol/L Final     Chloride 06/07/2019 110* 94 - 109 mmol/L Final     Carbon Dioxide 06/07/2019 26  20 - 32 mmol/L Final     Anion Gap 06/07/2019 6  3 - 14 mmol/L Final     Glucose 06/07/2019 84  70 - 99 mg/dL Final    Fasting specimen     Urea Nitrogen 06/07/2019 25  7 - 30 mg/dL Final     Creatinine 06/07/2019 1.31* 0.66 - 1.25 mg/dL Final     GFR Estimate 06/07/2019 54* >60 mL/min/[1.73_m2] Final    Comment: Non  GFR Calc  Starting 12/18/2018, serum creatinine based estimated GFR (eGFR) will be   calculated using the Chronic Kidney Disease Epidemiology Collaboration   (CKD-EPI) equation.       GFR Estimate If Black 06/07/2019 63  >60 mL/min/[1.73_m2] Final    Comment:  GFR Calc  Starting 12/18/2018, serum creatinine based estimated GFR (eGFR) will be   calculated using the Chronic Kidney Disease Epidemiology Collaboration   (CKD-EPI) equation.       Calcium 06/07/2019 8.9  8.5 - 10.1 mg/dL Final     Bilirubin Total 06/07/2019 0.8  0.2 - 1.3 mg/dL Final     Albumin 06/07/2019 3.7  3.4 - 5.0 g/dL Final     Protein Total 06/07/2019 7.2  6.8 - 8.8 g/dL Final     Alkaline Phosphatase 06/07/2019 85  40 - 150 U/L Final     ALT 06/07/2019 37  0 - 70 U/L Final     AST 06/07/2019 25  0  - 45 U/L Final     Creatinine Urine 06/07/2019 165  mg/dL Final     Albumin Urine mg/L 06/07/2019 26  mg/L Final     Albumin Urine mg/g Cr 06/07/2019 15.70  0 - 17 mg/g Cr Final     Cholesterol 06/07/2019 149  <200 mg/dL Final     Triglycerides 06/07/2019 90  <150 mg/dL Final    Fasting specimen     HDL Cholesterol 06/07/2019 47  >39 mg/dL Final     LDL Cholesterol Calculated 06/07/2019 84  <100 mg/dL Final    Desirable:       <100 mg/dl     Non HDL Cholesterol 06/07/2019 102  <130 mg/dL Final     PSA 06/07/2019 11.50* 0 - 4 ug/L Final    Assay Method:  Chemiluminescence using Siemens Vista analyzer       ASSESSMENT:    ICD-10-CM    1. Medicare annual wellness visit, subsequent Z00.00 GASTROENTEROLOGY ADULT REF PROCEDURE ONLY [facility] [phone]; No Provider Preference   2. Special screening for malignant neoplasms, colon Z12.11 GASTROENTEROLOGY ADULT REF PROCEDURE ONLY [facility] [phone]; No Provider Preference   3. CKD (chronic kidney disease) stage 3, GFR 30-59 ml/min (H) N18.3    4. Coronary artery disease involving native coronary artery of native heart without angina pectoris I25.10    5. Elevated PSA R97.20    6. 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  Health Maintenance Due   Topic Date Due     ZOSTER IMMUNIZATION (2 of 3) 02/04/2015     COLONOSCOPY  04/01/2019     MEDICARE ANNUAL WELLNESS VISIT  06/12/2019     Health Maintenance   Topic Date Due     ZOSTER IMMUNIZATION (2 of 3) 02/04/2015     COLONOSCOPY  04/01/2019     MEDICARE ANNUAL WELLNESS VISIT  06/12/2019     INFLUENZA VACCINE (Season Ended) 09/01/2019     BMP  06/07/2020     LIPID  06/07/2020     MICROALBUMIN  06/07/2020     ADVANCED DIRECTIVE PLANNING  06/12/2023     DTAP/TDAP/TD IMMUNIZATION (3 - Td) 10/28/2023     HEPATITIS C SCREENING  Completed     PHQ-2  Completed     PNEUMOCOCCAL IMMUNIZATION 65+ LOW/MEDIUM RISK  Completed     AORTIC ANEURYSM SCREENING (SYSTEM ASSIGNED)  Completed     IPV IMMUNIZATION   Aged Out     MENINGITIS IMMUNIZATION  Aged Out         HEALTH CARE MAINTENENCE: The recommended screening tests and vaccinatons for this patient have been discussed as above.  The appropriate tests and vaccinations  have been ordered or declined by the patient. Please see the orders in EPIC.The patient specifically declines: n/a     Immunization Status:  up to date and documented except for Shingrix #2    Patient Active Problem List   Diagnosis     HDL deficiency     Elevated PSA     Trigger thumb of left hand     Hyperlipidemia LDL goal <130     Benign prostatic hyperplasia with urinary obstruction     Encounter for counseling     10 year risk of MI or stroke 7.5% or greater, 12.9% in December 2015 on Simvastatin 20     Coronary artery disease involving native coronary artery of native heart without angina pectoris     AMI (acute mesenteric ischemia) (H)     CKD (chronic kidney disease) stage 3, GFR 30-59 ml/min (H)        ATP III Guidelines  ICSI Preventive Guidelines    PLAN:   FOLLOW UP with cardiology annually for CAD and medication refill(s)   Follow-up with urology as planned. We discussed his PSA today.   CHRONIC KIDNEY DISEASE is stable and dicussed with the patient   I recommended continuing to take a daily aspirin (81 to 325 mg) secondary to his CAD  Shingrix recommended  Colonoscopy recommended  Discussed calcium intake, vitamins and supplements. Recommended 1000 mg of calcium daily  Sunscreen use was recommended especially in the area of tatoos  Refills on chronic medication given  Recommended dental exams every 6 months  Recommended eye exam every 1-2 years  Follow up in 1 year for the next preventative medical visit    The patients chronic medication was filled for 12  months.    Body mass index is 28.37 kg/m .

## 2019-06-26 ENCOUNTER — TRANSFERRED RECORDS (OUTPATIENT)
Dept: HEALTH INFORMATION MANAGEMENT | Facility: CLINIC | Age: 74
End: 2019-06-26

## 2019-09-19 ENCOUNTER — TRANSFERRED RECORDS (OUTPATIENT)
Dept: HEALTH INFORMATION MANAGEMENT | Facility: CLINIC | Age: 74
End: 2019-09-19

## 2019-11-08 ENCOUNTER — TRANSFERRED RECORDS (OUTPATIENT)
Dept: HEALTH INFORMATION MANAGEMENT | Facility: CLINIC | Age: 74
End: 2019-11-08

## 2019-12-05 ENCOUNTER — TRANSFERRED RECORDS (OUTPATIENT)
Dept: HEALTH INFORMATION MANAGEMENT | Facility: CLINIC | Age: 74
End: 2019-12-05

## 2020-05-22 ENCOUNTER — TRANSFERRED RECORDS (OUTPATIENT)
Dept: HEALTH INFORMATION MANAGEMENT | Facility: CLINIC | Age: 75
End: 2020-05-22

## 2020-12-13 ENCOUNTER — HEALTH MAINTENANCE LETTER (OUTPATIENT)
Age: 75
End: 2020-12-13

## 2021-01-28 NOTE — PROGRESS NOTES
"  Angel Owens is a 75 year old who presents to clinic today for the following health issues   HPI       Concern - Ear Problem    Patient is here for a bilateral ear wash. No ear pain.  Wears bilateral hearing aids and doesn't hear well with wax build up.   Comes in about once a year to have them flushed. Tried to do it at home but was unsuccessful. No recent illnesses.    Review of Systems   Constitutional, HEENT, cardiovascular, pulmonary, gi and gu systems are negative, except as otherwise noted.      Objective    /74   Pulse 75   Temp 97.2  F (36.2  C) (Tympanic)   Ht 1.88 m (6' 2\")   Wt 99.3 kg (219 lb)   BMI 28.12 kg/m    Body mass index is 28.12 kg/m .  Physical Exam   GENERAL: healthy, alert and no distress  Head: Normocephalic, atraumatic.  Eyes: Conjunctiva clear, non icteric. PERRLA.  Ears: cerumen cherri.  TM: clear after irrigation of wax by my MA  Mouth / Throat: Normal dentition.  No oral lesions. Pharynx non erythematous, tonsils without hypertrophy.            ICD-10-CM    1. Bilateral impacted cerumen  H61.23    2. Wears hearing aid  Z97.4      Ears irrigated out by my MA  warning signs discussed.  Follow up  AS NEEDED     Leonel Juarez PA-C          "

## 2021-01-29 ENCOUNTER — OFFICE VISIT (OUTPATIENT)
Dept: FAMILY MEDICINE | Facility: CLINIC | Age: 76
End: 2021-01-29
Payer: COMMERCIAL

## 2021-01-29 VITALS
WEIGHT: 219 LBS | HEART RATE: 75 BPM | DIASTOLIC BLOOD PRESSURE: 74 MMHG | BODY MASS INDEX: 28.11 KG/M2 | SYSTOLIC BLOOD PRESSURE: 122 MMHG | HEIGHT: 74 IN | TEMPERATURE: 97.2 F

## 2021-01-29 DIAGNOSIS — Z97.4 WEARS HEARING AID: ICD-10-CM

## 2021-01-29 DIAGNOSIS — H61.23 BILATERAL IMPACTED CERUMEN: Primary | ICD-10-CM

## 2021-01-29 PROCEDURE — 99212 OFFICE O/P EST SF 10 MIN: CPT | Mod: 25 | Performed by: PHYSICIAN ASSISTANT

## 2021-01-29 PROCEDURE — 69209 REMOVE IMPACTED EAR WAX UNI: CPT | Performed by: PHYSICIAN ASSISTANT

## 2021-01-29 ASSESSMENT — MIFFLIN-ST. JEOR: SCORE: 1808.13

## 2021-03-05 ENCOUNTER — IMMUNIZATION (OUTPATIENT)
Dept: PEDIATRICS | Facility: CLINIC | Age: 76
End: 2021-03-05
Payer: COMMERCIAL

## 2021-03-05 PROCEDURE — 0001A PR COVID VAC PFIZER DIL RECON 30 MCG/0.3 ML IM: CPT

## 2021-03-05 PROCEDURE — 91300 PR COVID VAC PFIZER DIL RECON 30 MCG/0.3 ML IM: CPT

## 2021-03-26 ENCOUNTER — IMMUNIZATION (OUTPATIENT)
Dept: PEDIATRICS | Facility: CLINIC | Age: 76
End: 2021-03-26
Attending: INTERNAL MEDICINE
Payer: COMMERCIAL

## 2021-03-26 PROCEDURE — 0002A PR COVID VAC PFIZER DIL RECON 30 MCG/0.3 ML IM: CPT

## 2021-03-26 PROCEDURE — 91300 PR COVID VAC PFIZER DIL RECON 30 MCG/0.3 ML IM: CPT

## 2021-06-14 NOTE — PROGRESS NOTES
"SUBJECTIVE:   Roman Calero is a 76 year old male who presents for Preventive Visit.      Patient has been advised of split billing requirements and indicates understanding: Yes   Are you in the first 12 months of your Medicare coverage?  No    Healthy Habits:     In general, how would you rate your overall health?  Good    Frequency of exercise:  4-5 days/week    Duration of exercise:  30-45 minutes    Do you usually eat at least 4 servings of fruit and vegetables a day, include whole grains    & fiber and avoid regularly eating high fat or \"junk\" foods?  Yes    Taking medications regularly:  Yes    Medication side effects:  None    Ability to successfully perform activities of daily living:  No assistance needed    Home Safety:  No safety concerns identified    Hearing Impairment:  Difficulty following a conversation in a noisy restaurant or crowded room, feel that people are mumbling or not speaking clearly, need to ask people to speak up or repeat themselves, find that men's voices are easier to understand than woman's, difficulty understanding soft or whispered speech and difficulty understanding speech on the telephone    In the past 6 months, have you been bothered by leaking of urine?  No    In general, how would you rate your overall mental or emotional health?  Good      PHQ-2 Total Score: 0    Additional concerns today:  No    Do you feel safe in your environment? Yes    Have you ever done Advance Care Planning? (For example, a Health Directive, POLST, or a discussion with a medical provider or your loved ones about your wishes): No, advance care planning information given to patient to review.  Patient plans to discuss their wishes with loved ones or provider.          Fall risk  Fallen 2 or more times in the past year?: No  Any fall with injury in the past year?: No    Cognitive Screening   1) Repeat 3 items (Leader, Season, Table)    2) Clock draw: NORMAL  3) 3 item recall: Recalls 3 objects  Results: " 3 items recalled: COGNITIVE IMPAIRMENT LESS LIKELY    Mini-CogTM Copyright ZAFAR Roca. Licensed by the author for use in [facility]; reprinted with permission (eben@[facility]). All rights reserved.      Do you have sleep apnea, excessive snoring or daytime drowsiness?: no    Reviewed and updated as needed this visit by clinical staff  Tobacco  Allergies  Meds   Med Hx  Surg Hx  Fam Hx  Soc Hx        Reviewed and updated as needed this visit by Provider                Social History     Tobacco Use     Smoking status: Never Smoker     Smokeless tobacco: Never Used   Substance Use Topics     Alcohol use: Yes     Comment: occ         Alcohol Use 6/21/2021   Prescreen: >3 drinks/day or >7 drinks/week? No   Prescreen: >3 drinks/day or >7 drinks/week? -               Current providers sharing in care for this patient include:   Patient Care Team:  Keith Willson MD as PCP - General (Family Practice)  Oppel, Leonel Richards PA-C as Assigned PCP    The following health maintenance items are reviewed in Epic and correct as of today:  Health Maintenance Due   Topic Date Due     ANNUAL REVIEW OF HM ORDERS  Never done     BMP  06/07/2020     LIPID  06/07/2020     MICROALBUMIN  06/07/2020     FALL RISK ASSESSMENT  06/14/2020               Review of Systems   Constitutional: Negative for chills and fever.   HENT: Positive for hearing loss. Negative for congestion, ear pain and sore throat.    Eyes: Negative for pain and visual disturbance.   Respiratory: Positive for shortness of breath. Negative for cough.    Cardiovascular: Negative for chest pain, palpitations and peripheral edema.   Gastrointestinal: Negative for abdominal pain, constipation, diarrhea, heartburn, hematochezia and nausea.   Genitourinary: Positive for frequency and urgency. Negative for discharge, dysuria, genital sores, hematuria and impotence.   Musculoskeletal: Positive for arthralgias and myalgias. Negative for joint swelling.   Skin:  "Negative for rash.   Neurological: Positive for dizziness. Negative for weakness, headaches and paresthesias.   Psychiatric/Behavioral: Negative for mood changes. The patient is not nervous/anxious.          OBJECTIVE:   /71   Pulse 58   Temp 98.1  F (36.7  C) (Oral)   Ht 1.88 m (6' 2\")   Wt 98.3 kg (216 lb 12.8 oz)   SpO2 99%   BMI 27.84 kg/m   Estimated body mass index is 27.84 kg/m  as calculated from the following:    Height as of this encounter: 1.88 m (6' 2\").    Weight as of this encounter: 98.3 kg (216 lb 12.8 oz).  Physical Exam          ASSESSMENT / PLAN:       ICD-10-CM    1. Encounter for Medicare annual wellness exam  Z00.00    2. Stage 3 chronic kidney disease, unspecified whether stage 3a or 3b CKD  N18.30 Comprehensive metabolic panel     JUST IN CASE     Hemoglobin     Albumin Random Urine Quantitative with Creat Ratio     Parathyroid Hormone Intact   3. Coronary artery disease involving native coronary artery of native heart without angina pectoris  I25.10    4. Benign prostatic hyperplasia with urinary obstruction  N40.1     N13.8    5. HDL deficiency  E78.6    6. Hyperlipidemia LDL goal <70  E78.5 Lipid panel reflex to direct LDL Fasting     Comprehensive metabolic panel   7. Elevated PSA  R97.20 PSA, screen   8. Encounter for screening for malignant neoplasm of prostate   Z12.5 PSA, screen       Patient has been advised of split billing requirements and indicates understanding: Yes  COUNSELING:  Reviewed preventive health counseling, as reflected in patient instructions       Regular exercise       Healthy diet/nutrition       Vision screening       Dental care       Aspirin prophylaxis        Colon cancer screening       Prostate cancer screening    Estimated body mass index is 27.84 kg/m  as calculated from the following:    Height as of this encounter: 1.88 m (6' 2\").    Weight as of this encounter: 98.3 kg (216 lb 12.8 oz).        He reports that he has never smoked. He has never " used smokeless tobacco.      Appropriate preventive services were discussed with this patient, including applicable screening as appropriate for cardiovascular disease, diabetes, osteopenia/osteoporosis, and glaucoma.  As appropriate for age/gender, discussed screening for colorectal cancer, prostate cancer, breast cancer, and cervical cancer. Checklist reviewing preventive services available has been given to the patient.    Reviewed patients plan of care and provided an AVS. The Basic Care Plan (routine screening as documented in Health Maintenance) for Roman meets the Care Plan requirement. This Care Plan has been established and reviewed with the Patient.    Counseling Resources:  ATP IV Guidelines  Pooled Cohorts Equation Calculator  Breast Cancer Risk Calculator  Breast Cancer: Medication to Reduce Risk  FRAX Risk Assessment  ICSI Preventive Guidelines  Dietary Guidelines for Americans, 2010  gamigo's MyPlate  ASA Prophylaxis  Lung CA Screening    Keith Willson MD  [facility]    Identified Health Risks:  --------------------------------------------------------------------------------------------------------------------------------------  SUBJECTIVE:  Roman Calero is a 76 year old male who presents to the clinic today for a routine physical exam.    The patient's last physical was 2 years ago.     Cholesterol   Date Value Ref Range Status   06/07/2019 149 <200 mg/dL Final   05/14/2018 129 <200 mg/dL Final     HDL Cholesterol   Date Value Ref Range Status   06/07/2019 47 >39 mg/dL Final   05/14/2018 46 >39 mg/dL Final     LDL Cholesterol Calculated   Date Value Ref Range Status   06/07/2019 84 <100 mg/dL Final     Comment:     Desirable:       <100 mg/dl   05/14/2018 59 <100 mg/dL Final     Comment:     Desirable:       <100 mg/dl     Triglycerides   Date Value Ref Range Status   06/07/2019 90 <150 mg/dL Final     Comment:     Fasting specimen   05/14/2018 118 <150 mg/dL Final      Comment:     Fasting specimen     Cholesterol/HDL Ratio   Date Value Ref Range Status   11/17/2014 3.5 0.0 - 5.0 Final   10/28/2013 4.1 0.0 - 5.0 Final     The patient's last fasting lipid panel was done 2 years ago and the results are listed above.      The 10-year ASCVD risk score (Rakel ROSARIO Jr., et al., 2013) is: 19.7%    Values used to calculate the score:      Age: 76 years      Sex: Male      Is Non- : No      Diabetic: No      Tobacco smoker: No      Systolic Blood Pressure: 121 mmHg      Is BP treated: No      HDL Cholesterol: 47 mg/dL      Total Cholesterol: 149 mg/dL      Risk Enhancers:  Personal history of  ATHEROSCLEROTIC CARDIOVASCULAR DISEASE diagnosis in 2016    The patient reports that he has been treated for high blood pressure.    The patient reports that he does take a daily aspirin.    Lab Results   Component Value Date    HCVAB Negative 10/28/2013     The patient reports that he has been screened for Hepatitis C    (Screen all baby boomers once per CDC-- the generation born from 1945 through 1965 and per USPTF screen age 19 to 79 especially younger people who have used IV drugs)  He would not like to have an Hepatitis C test today    No results found for: HIAGAB  The patient reports that he has not been screened for HIV   (Screen all 15 to 64 years old)        Immunization History   Administered Date(s) Administered     COVID-19,PF,Pfizer 03/05/2021, 03/26/2021     Flu 65+ Years 10/16/2018     Influenza (High Dose) 3 valent vaccine 10/28/2013, 01/04/2016, 09/21/2016, 10/18/2017, 10/16/2019, 10/23/2020     Influenza (IIV3) PF 10/27/2010, 10/30/2012, 11/14/2014     Pneumo Conj 13-V (2010&after) 05/22/2017     Pneumococcal 23 valent 11/17/2014     TD (ADULT, 7+) 01/01/2009     TDAP Vaccine (Adacel) 10/28/2013     Td (Adult), Adsorbed 05/21/2006     Zoster vaccine recombinant adjuvanted (SHINGRIX) 02/01/2019, 06/24/2019     Zoster vaccine, live 11/01/2014     The patient's  believes that his last tetanus shot was given 8 year(s) ago.   The patient believes that he has had a Shingrix in the past  The patient believes that he has had a PPSV23 in the past.  The patient believes that he has had a PCV13 in the past.  The patient believes that he has had a seasonal flu vaccination this fall or winter.  The patient would like to have a no vaccinations today      No results found for this or any previous visit.]   The patient denies a family history of colon cancer.  The patient reports that he has had a colonoscopy. His  last colonoscopy was in 2019 and he  report that is was normal. The patient was told to have this repeated in 10 years.    The patient reports that he and his wife or partner are not using contraception as she has gone through menopause.  The patient denies a family history of diabetes.  The patient denies a family history of prostate cancer. After discussing the pros and cons of checking a PSA he  Does want to have this test drawn today. He has had an elevated PSA previously   The patient reports that he eats or drinks 3 servings of dairy products per day.   The patient reports that he has dental appointments approximately every 6 months.  The patient reports that he  has an eye examination approximately every 2 year(s).    Do you currently smoke? No  How many years have you smoked? 0   How many packs per day did you smoke on average? N/A  (if more than 30 pack year history and the patient is age 55-80 consider ordering an annual low dose radiation lung CT to screen for cancer)  (Do not order if patient has quit more than 15 years ago or has a health condition that limits life expectancy or could not tolerate curative lung surgery)  Are you interested having a lung CT to screen for lung cancer? N/A    If the patient has smoked more that 100 cigarettes, has the patient had an imaging study (US or CT) for an AAA between the ages of 65 and 75? N/A            Patient Active  Problem List   Diagnosis     HDL deficiency     Elevated PSA     Trigger thumb of left hand     Hyperlipidemia LDL goal <130     Benign prostatic hyperplasia with urinary obstruction     Encounter for counseling     10 year risk of MI or stroke 7.5% or greater, 12.9% in December 2015 on Simvastatin 20     Coronary artery disease involving native coronary artery of native heart without angina pectoris     AMI (acute mesenteric ischemia) (H)     CKD (chronic kidney disease) stage 3, GFR 30-59 ml/min     Wears hearing aid       History reviewed. No pertinent surgical history.    Family History   Problem Relation Age of Onset     Hypertension Mother      Lipids Mother      Dementia Mother      Cancer Father         Hodgkin's age 63       Social History     Socioeconomic History     Marital status:      Spouse name: Not on file     Number of children: Not on file     Years of education: Not on file     Highest education level: Not on file   Occupational History     Not on file   Social Needs     Financial resource strain: Not on file     Food insecurity     Worry: Not on file     Inability: Not on file     Transportation needs     Medical: Not on file     Non-medical: Not on file   Tobacco Use     Smoking status: Never Smoker     Smokeless tobacco: Never Used   Substance and Sexual Activity     Alcohol use: Yes     Comment: occ     Drug use: No     Sexual activity: Yes     Partners: Female   Lifestyle     Physical activity     Days per week: Not on file     Minutes per session: Not on file     Stress: Not on file   Relationships     Social connections     Talks on phone: Not on file     Gets together: Not on file     Attends Protestant service: Not on file     Active member of club or organization: Not on file     Attends meetings of clubs or organizations: Not on file     Relationship status: Not on file     Intimate partner violence     Fear of current or ex partner: Not on file     Emotionally abused: Not on  "file     Physically abused: Not on file     Forced sexual activity: Not on file   Other Topics Concern     Parent/sibling w/ CABG, MI or angioplasty before 65F 55M? Not Asked   Social History Narrative     Not on file       Current Outpatient Medications   Medication Sig Dispense Refill     aspirin 81 MG tablet Take 1 tablet by mouth daily.       atorvastatin (LIPITOR) 40 MG tablet Take 40 mg by mouth daily ADDIS.       Calcium Polycarbophil (FIBER) 625 MG tablet Take 1,350 mg by mouth daily       ciclopirox (PENLAC) 8 % external solution APPLY DAILY TO TOE NAILS AS NEEDED       clopidogrel (PLAVIX) 75 MG tablet Take 75 mg by mouth daily       diphenhydrAMINE-acetaminophen (TYLENOL PM)  MG tablet Take by mouth nightly as needed for sleep       lisinopril (PRINIVIL,ZESTRIL) 2.5 MG tablet Take 2.5 mg by mouth daily        loratadine (CLARITIN) 10 MG tablet Take 10 mg by mouth daily       metoprolol (LOPRESSOR) 25 MG tablet Take 0.25 mg by mouth 2 times daily        Misc Natural Products (FIBER 7 PO) Take 2 tablets by mouth daily.       Multiple Vitamins-Minerals (VITRUM 50+ SENIOR MULTI) TABS Take 1 tablet by mouth daily       tamsulosin (FLOMAX) 0.4 MG capsule TAKE 1 CAPSULE BY MOUTH EVERY DAY       nitroglycerin (NITROSTAT) 0.4 MG SL tablet Place 0.4 mg under the tongue every 5 minutes as needed for chest pain if you are still having symptoms after 3 doses (15 minutes) call 911.             PHYSICAL EXAMINATION:  Blood pressure 121/71, pulse 58, temperature 98.1  F (36.7  C), temperature source Oral, height 1.88 m (6' 2\"), weight 98.3 kg (216 lb 12.8 oz), SpO2 99 %.  General appearance - healthy, alert and no distress  Skin - Skin color, texture, turgor normal. No rashes or lesions.  Head - Normocephalic. No masses, lesions, tenderness or abnormalities  Eyes - conjunctivae/corneas clear. PERRL, EOM's intact. Fundi benign  Ears - External ears normal. Canals clear. TM's normal.  Nose/Sinuses - Nares normal. " Septum midline. Mucosa normal. No drainage or sinus tenderness.  Oropharynx - Lips, mucosa, and tongue normal. Teeth and gums normal.  Neck - Neck supple. No adenopathy. Thyroid symmetric, normal size,  Lungs - Percussion normal. Good diaphragmatic excursion. Lungs clear  Heart - PMI normal. No lifts, heaves, or thrills. RRR. No murmurs, clicks gallops or rub  Abdomen - Abdomen soft, non-tender. BS normal. No masses, organomegaly  Extremities - Extremities normal. No deformities, edema, or skin discoloration.  Musculoskeletal - Spine ROM normal. Muscular strength intact.  Peripheral pulses - radial=4/4, femoral=4/4, popliteal=4/4, dorsalis pedis=4/4,  Neuro - Gait normal. Reflexes normal and symmetric. Sensation grossly WNL.  Genitalia - Penis normal. No urethral discharge. Scrotum normal to palpation. No hernia.  Rectal - deferred      Orders Only on 06/07/2019   Component Date Value Ref Range Status     Parathyroid Hormone Intact 06/07/2019 40  18 - 80 pg/mL Final     Hemoglobin 06/07/2019 13.8  13.3 - 17.7 g/dL Final     Sodium 06/07/2019 142  133 - 144 mmol/L Final     Potassium 06/07/2019 4.2  3.4 - 5.3 mmol/L Final     Chloride 06/07/2019 110* 94 - 109 mmol/L Final     Carbon Dioxide 06/07/2019 26  20 - 32 mmol/L Final     Anion Gap 06/07/2019 6  3 - 14 mmol/L Final     Glucose 06/07/2019 84  70 - 99 mg/dL Final    Fasting specimen     Urea Nitrogen 06/07/2019 25  7 - 30 mg/dL Final     Creatinine 06/07/2019 1.31* 0.66 - 1.25 mg/dL Final     GFR Estimate 06/07/2019 54* >60 mL/min/[1.73_m2] Final    Comment: Non  GFR Calc  Starting 12/18/2018, serum creatinine based estimated GFR (eGFR) will be   calculated using the Chronic Kidney Disease Epidemiology Collaboration   (CKD-EPI) equation.       GFR Estimate If Black 06/07/2019 63  >60 mL/min/[1.73_m2] Final    Comment:  GFR Calc  Starting 12/18/2018, serum creatinine based estimated GFR (eGFR) will be   calculated using the  Chronic Kidney Disease Epidemiology Collaboration   (CKD-EPI) equation.       Calcium 06/07/2019 8.9  8.5 - 10.1 mg/dL Final     Bilirubin Total 06/07/2019 0.8  0.2 - 1.3 mg/dL Final     Albumin 06/07/2019 3.7  3.4 - 5.0 g/dL Final     Protein Total 06/07/2019 7.2  6.8 - 8.8 g/dL Final     Alkaline Phosphatase 06/07/2019 85  40 - 150 U/L Final     ALT 06/07/2019 37  0 - 70 U/L Final     AST 06/07/2019 25  0 - 45 U/L Final     Creatinine Urine 06/07/2019 165  mg/dL Final     Albumin Urine mg/L 06/07/2019 26  mg/L Final     Albumin Urine mg/g Cr 06/07/2019 15.70  0 - 17 mg/g Cr Final     Cholesterol 06/07/2019 149  <200 mg/dL Final     Triglycerides 06/07/2019 90  <150 mg/dL Final    Fasting specimen     HDL Cholesterol 06/07/2019 47  >39 mg/dL Final     LDL Cholesterol Calculated 06/07/2019 84  <100 mg/dL Final    Desirable:       <100 mg/dl     Non HDL Cholesterol 06/07/2019 102  <130 mg/dL Final     PSA 06/07/2019 11.50* 0 - 4 ug/L Final    Assay Method:  Chemiluminescence using Siemens Vista analyzer       ASSESSMENT:    ICD-10-CM    1. Encounter for Medicare annual wellness exam  Z00.00    2. Stage 3 chronic kidney disease, unspecified whether stage 3a or 3b CKD  N18.30    3. Coronary artery disease involving native coronary artery of native heart without angina pectoris  I25.10    4. Benign prostatic hyperplasia with urinary obstruction  N40.1     N13.8    5. HDL deficiency  E78.6    6. Hyperlipidemia LDL goal <70  E78.5 Lipid panel reflex to direct LDL Fasting       Well-Adult Physical Exam.  Health Maintenance Due   Topic Date Due     ANNUAL REVIEW OF HM ORDERS  Never done     BMP  06/07/2020     LIPID  06/07/2020     MICROALBUMIN  06/07/2020     FALL RISK ASSESSMENT  06/14/2020     Health Maintenance   Topic Date Due     ANNUAL REVIEW OF HM ORDERS  Never done     BMP  06/07/2020     LIPID  06/07/2020     MICROALBUMIN  06/07/2020     FALL RISK ASSESSMENT  06/14/2020     MEDICARE ANNUAL WELLNESS VISIT   06/21/2022     ADVANCE CARE PLANNING  06/12/2023     DTAP/TDAP/TD IMMUNIZATION (3 - Td) 10/28/2023     COLORECTAL CANCER SCREENING  12/05/2029     HEPATITIS C SCREENING  Completed     PHQ-2  Completed     INFLUENZA VACCINE  Completed     Pneumococcal Vaccine: 65+ Years  Completed     ZOSTER IMMUNIZATION  Completed     AORTIC ANEURYSM SCREENING (SYSTEM ASSIGNED)  Completed     COVID-19 Vaccine  Completed     IPV IMMUNIZATION  Aged Out     MENINGITIS IMMUNIZATION  Aged Out     HEPATITIS B IMMUNIZATION  Aged Out         HEALTH CARE MAINTENENCE: The recommended screening tests and vaccinatons for this patient have been discussed as above.  The appropriate tests and vaccinations  have been ordered or declined by the patient. Please see the orders in EPIC.The patient specifically declines: n/a     Immunization Status:  up to date and documented    Patient Active Problem List   Diagnosis     HDL deficiency     Elevated PSA     Trigger thumb of left hand     Hyperlipidemia LDL goal <130     Benign prostatic hyperplasia with urinary obstruction     Encounter for counseling     10 year risk of MI or stroke 7.5% or greater, 12.9% in December 2015 on Simvastatin 20     Coronary artery disease involving native coronary artery of native heart without angina pectoris     AMI (acute mesenteric ischemia) (H)     CKD (chronic kidney disease) stage 3, GFR 30-59 ml/min     Wears hearing aid        ATP III Guidelines  ICSI Preventive Guidelines    PLAN:   Check a fasting lipid profile. ig his LDL is over 70 he is willing to take a higher dose of his atorvastatin   I recommended continuing to take a daily aspirin (81 to 325 mg)  Check a fasting glucose  Check a PSA as requested by the patient in anticipation of his urology appointment(s) in 1 week with Dr Jade  Discussed calcium intake, vitamins and supplements. Recommended 1200 mg of calcium daily  Sunscreen use was recommended especially in the area of tatoos  Recommended dental exams  every 6 months  Recommended eye exam every 1-2 years  Follow up in 1 year for the next preventative medical visit    The patient reported that he did not need any refills at this time.    Body mass index is 27.84 kg/m .

## 2021-06-14 NOTE — PATIENT INSTRUCTIONS
Patient Education   Personalized Prevention Plan  You are due for the preventive services outlined below.  Your care team is available to assist you in scheduling these services.  If you have already completed any of these items, please share that information with your care team to update in your medical record.  Health Maintenance Due   Topic Date Due     ANNUAL REVIEW OF HM ORDERS  Never done     Basic Metabolic Panel  06/07/2020     Cholesterol Lab  06/07/2020     Kidney Microalbumin Urine Test  06/07/2020     FALL RISK ASSESSMENT  06/14/2020     PHQ-2  01/01/2021           He was intructed to use mineral or olive oil to prevent reacummulation of the cerumen. he was advised to put 2-3 drops into each ear 1 time a week before showers of baths and to rinse away any of the oily residue at the end of his shower.

## 2021-06-21 ENCOUNTER — OFFICE VISIT (OUTPATIENT)
Dept: FAMILY MEDICINE | Facility: CLINIC | Age: 76
End: 2021-06-21
Payer: COMMERCIAL

## 2021-06-21 VITALS
HEIGHT: 74 IN | DIASTOLIC BLOOD PRESSURE: 71 MMHG | BODY MASS INDEX: 27.82 KG/M2 | WEIGHT: 216.8 LBS | TEMPERATURE: 98.1 F | HEART RATE: 58 BPM | OXYGEN SATURATION: 99 % | SYSTOLIC BLOOD PRESSURE: 121 MMHG

## 2021-06-21 DIAGNOSIS — Z12.5 ENCOUNTER FOR SCREENING FOR MALIGNANT NEOPLASM OF PROSTATE: ICD-10-CM

## 2021-06-21 DIAGNOSIS — Z00.00 ENCOUNTER FOR MEDICARE ANNUAL WELLNESS EXAM: Primary | ICD-10-CM

## 2021-06-21 DIAGNOSIS — R97.20 ELEVATED PSA: ICD-10-CM

## 2021-06-21 DIAGNOSIS — I25.10 CORONARY ARTERY DISEASE INVOLVING NATIVE CORONARY ARTERY OF NATIVE HEART WITHOUT ANGINA PECTORIS: ICD-10-CM

## 2021-06-21 DIAGNOSIS — E78.6 HDL DEFICIENCY: ICD-10-CM

## 2021-06-21 DIAGNOSIS — E78.5 HYPERLIPIDEMIA LDL GOAL <70: ICD-10-CM

## 2021-06-21 DIAGNOSIS — N18.30 STAGE 3 CHRONIC KIDNEY DISEASE, UNSPECIFIED WHETHER STAGE 3A OR 3B CKD (H): ICD-10-CM

## 2021-06-21 DIAGNOSIS — N40.1 BENIGN PROSTATIC HYPERPLASIA WITH URINARY OBSTRUCTION: ICD-10-CM

## 2021-06-21 DIAGNOSIS — N13.8 BENIGN PROSTATIC HYPERPLASIA WITH URINARY OBSTRUCTION: ICD-10-CM

## 2021-06-21 LAB
ALBUMIN SERPL-MCNC: 3.7 G/DL (ref 3.4–5)
ALP SERPL-CCNC: 76 U/L (ref 40–150)
ALT SERPL W P-5'-P-CCNC: 38 U/L (ref 0–70)
ANION GAP SERPL CALCULATED.3IONS-SCNC: 5 MMOL/L (ref 3–14)
AST SERPL W P-5'-P-CCNC: 26 U/L (ref 0–45)
BILIRUB SERPL-MCNC: 0.8 MG/DL (ref 0.2–1.3)
BUN SERPL-MCNC: 24 MG/DL (ref 7–30)
CALCIUM SERPL-MCNC: 8.5 MG/DL (ref 8.5–10.1)
CHLORIDE SERPL-SCNC: 111 MMOL/L (ref 94–109)
CHOLEST SERPL-MCNC: 121 MG/DL
CO2 SERPL-SCNC: 25 MMOL/L (ref 20–32)
CREAT SERPL-MCNC: 1.3 MG/DL (ref 0.66–1.25)
CREAT UR-MCNC: 112 MG/DL
GFR SERPL CREATININE-BSD FRML MDRD: 54 ML/MIN/{1.73_M2}
GLUCOSE SERPL-MCNC: 88 MG/DL (ref 70–99)
HDLC SERPL-MCNC: 46 MG/DL
HGB BLD-MCNC: 13.8 G/DL (ref 13.3–17.7)
LDLC SERPL CALC-MCNC: 55 MG/DL
MICROALBUMIN UR-MCNC: 6 MG/L
MICROALBUMIN/CREAT UR: 5.22 MG/G CR (ref 0–17)
NONHDLC SERPL-MCNC: 75 MG/DL
POTASSIUM SERPL-SCNC: 4.2 MMOL/L (ref 3.4–5.3)
PROT SERPL-MCNC: 7.5 G/DL (ref 6.8–8.8)
PSA SERPL-ACNC: 11 UG/L (ref 0–4)
PTH-INTACT SERPL-MCNC: 57 PG/ML (ref 18–80)
SODIUM SERPL-SCNC: 141 MMOL/L (ref 133–144)
TRIGL SERPL-MCNC: 102 MG/DL

## 2021-06-21 PROCEDURE — 36415 COLL VENOUS BLD VENIPUNCTURE: CPT | Performed by: FAMILY MEDICINE

## 2021-06-21 PROCEDURE — 80053 COMPREHEN METABOLIC PANEL: CPT | Performed by: FAMILY MEDICINE

## 2021-06-21 PROCEDURE — 85018 HEMOGLOBIN: CPT | Performed by: FAMILY MEDICINE

## 2021-06-21 PROCEDURE — 83970 ASSAY OF PARATHORMONE: CPT | Performed by: FAMILY MEDICINE

## 2021-06-21 PROCEDURE — 82043 UR ALBUMIN QUANTITATIVE: CPT | Performed by: FAMILY MEDICINE

## 2021-06-21 PROCEDURE — 80061 LIPID PANEL: CPT | Performed by: FAMILY MEDICINE

## 2021-06-21 PROCEDURE — 99397 PER PM REEVAL EST PAT 65+ YR: CPT | Performed by: FAMILY MEDICINE

## 2021-06-21 PROCEDURE — G0103 PSA SCREENING: HCPCS | Performed by: FAMILY MEDICINE

## 2021-06-21 RX ORDER — CICLOPIROX 80 MG/ML
SOLUTION TOPICAL
COMMUNITY
Start: 2021-06-02 | End: 2022-12-06

## 2021-06-21 RX ORDER — TAMSULOSIN HYDROCHLORIDE 0.4 MG/1
CAPSULE ORAL
COMMUNITY
Start: 2021-06-01 | End: 2021-06-29

## 2021-06-21 ASSESSMENT — ENCOUNTER SYMPTOMS
ARTHRALGIAS: 1
FREQUENCY: 1
PALPITATIONS: 0
FEVER: 0
HEADACHES: 0
COUGH: 0
WEAKNESS: 0
DIARRHEA: 0
HEMATURIA: 0
MYALGIAS: 1
NAUSEA: 0
NERVOUS/ANXIOUS: 0
ABDOMINAL PAIN: 0
HEMATOCHEZIA: 0
PARESTHESIAS: 0
DIZZINESS: 1
EYE PAIN: 0
HEARTBURN: 0
SHORTNESS OF BREATH: 1
CHILLS: 0
JOINT SWELLING: 0
CONSTIPATION: 0
DYSURIA: 0
SORE THROAT: 0

## 2021-06-21 ASSESSMENT — ACTIVITIES OF DAILY LIVING (ADL): CURRENT_FUNCTION: NO ASSISTANCE NEEDED

## 2021-06-21 ASSESSMENT — MIFFLIN-ST. JEOR: SCORE: 1793.15

## 2021-06-22 NOTE — RESULT ENCOUNTER NOTE
Roman,  I have reviewed the results of the laboratory tests that we recently ordered. Your cholesterol looks great. Please follow up with Dr Jade as planned regarding the PSA.   Sincerely,   Keith Willson MD

## 2021-06-29 ENCOUNTER — OFFICE VISIT (OUTPATIENT)
Dept: UROLOGY | Facility: CLINIC | Age: 76
End: 2021-06-29
Payer: COMMERCIAL

## 2021-06-29 VITALS — OXYGEN SATURATION: 98 % | SYSTOLIC BLOOD PRESSURE: 138 MMHG | HEART RATE: 71 BPM | DIASTOLIC BLOOD PRESSURE: 83 MMHG

## 2021-06-29 DIAGNOSIS — R97.20 ELEVATED PSA: ICD-10-CM

## 2021-06-29 DIAGNOSIS — N40.1 BENIGN PROSTATIC HYPERPLASIA WITH URINARY OBSTRUCTION: Primary | ICD-10-CM

## 2021-06-29 DIAGNOSIS — N13.8 BENIGN PROSTATIC HYPERPLASIA WITH URINARY OBSTRUCTION: Primary | ICD-10-CM

## 2021-06-29 PROCEDURE — 99203 OFFICE O/P NEW LOW 30 MIN: CPT | Performed by: UROLOGY

## 2021-06-29 RX ORDER — TAMSULOSIN HYDROCHLORIDE 0.4 MG/1
CAPSULE ORAL
Qty: 90 CAPSULE | Refills: 3 | Status: SHIPPED | OUTPATIENT
Start: 2021-06-29 | End: 2022-08-16

## 2021-06-29 NOTE — PROGRESS NOTES
S: Roman Calero is a pleasant  76 year old male who was requested to be seen by  Keith Willson for a consult with regard to patient's urinary complaints with elevated psa.  Patient complains of mild LUTS.  He is on flomax which works well.  He has history of elevated PSA with negative prostate biopsy several years ago.  His psa is stable at 11's.    His recent PSA was found to be   PSA   Date Value Ref Range Status   06/21/2021 11.00 (H) 0 - 4 ug/L Final     Comment:     Assay Method:  Chemiluminescence using Siemens Vista analyzer       Current Outpatient Medications   Medication Sig Dispense Refill     aspirin 81 MG tablet Take 1 tablet by mouth daily.       atorvastatin (LIPITOR) 40 MG tablet Take 40 mg by mouth daily ADDIS.       ciclopirox (PENLAC) 8 % external solution APPLY DAILY TO TOE NAILS AS NEEDED       clopidogrel (PLAVIX) 75 MG tablet Take 75 mg by mouth daily       diphenhydrAMINE-acetaminophen (TYLENOL PM)  MG tablet Take by mouth nightly as needed for sleep       lisinopril (PRINIVIL,ZESTRIL) 2.5 MG tablet Take 2.5 mg by mouth daily        loratadine (CLARITIN) 10 MG tablet Take 10 mg by mouth daily       metoprolol (LOPRESSOR) 25 MG tablet Take 0.25 mg by mouth 2 times daily        Misc Natural Products (FIBER 7 PO) Take 2 tablets by mouth daily.       Multiple Vitamins-Minerals (VITRUM 50+ SENIOR MULTI) TABS Take 1 tablet by mouth daily       tamsulosin (FLOMAX) 0.4 MG capsule TAKE 1 CAPSULE BY MOUTH EVERY DAY 90 capsule 3     Calcium Polycarbophil (FIBER) 625 MG tablet Take 1,350 mg by mouth daily       nitroglycerin (NITROSTAT) 0.4 MG SL tablet Place 0.4 mg under the tongue every 5 minutes as needed for chest pain if you are still having symptoms after 3 doses (15 minutes) call 911.       No Known Allergies  No past medical history on file.  No past surgical history on file.   Family History   Problem Relation Age of Onset     Hypertension Mother      Lipids Mother      Dementia Mother       Cancer Father         Hodgkin's age 63     He does not have a family history of prostate cancer.  Social History     Socioeconomic History     Marital status:      Spouse name: None     Number of children: None     Years of education: None     Highest education level: None   Occupational History     None   Social Needs     Financial resource strain: None     Food insecurity     Worry: None     Inability: None     Transportation needs     Medical: None     Non-medical: None   Tobacco Use     Smoking status: Never Smoker     Smokeless tobacco: Never Used   Substance and Sexual Activity     Alcohol use: Yes     Comment: occ     Drug use: No     Sexual activity: Yes     Partners: Female   Lifestyle     Physical activity     Days per week: None     Minutes per session: None     Stress: None   Relationships     Social connections     Talks on phone: None     Gets together: None     Attends Congregational service: None     Active member of club or organization: None     Attends meetings of clubs or organizations: None     Relationship status: None     Intimate partner violence     Fear of current or ex partner: None     Emotionally abused: None     Physically abused: None     Forced sexual activity: None   Other Topics Concern     Parent/sibling w/ CABG, MI or angioplasty before 65F 55M? Not Asked   Social History Narrative     None        REVIEW OF SYSTEMS  =================  C: NEGATIVE for fever, chills, change in weight  I: NEGATIVE for worrisome rashes, moles or lesions  E/M: NEGATIVE for ear, mouth and throat problems  R: NEGATIVE for significant cough or SHORTNESS OF BREATH  CV:  NEGATIVE for chest pain, palpitations or peripheral edema  GI: NEGATIVE for nausea, abdominal pain, heartburn, or change in bowel habits  NEURO: NEGATIVE numbness/weakness  : see HPI  PSYCH: NEGATIVE depression/anxiety  LYmph: no new enlarged lymph nodes  Ortho: no new trauma/movements           O: Exam:/83 (BP Location: Right  arm, Patient Position: Chair, Cuff Size: Adult Large)   Pulse 71   SpO2 98%    Constitutional: healthy, alert and no distress  Cardiovascular: negative, PMI normal.   Respiratory: negative, no evidence of respiratory distress  Gastrointestinal: Abdomen soft, non-tender. BS normal. No masses, organomegaly  : penis no discharge. Testis no masses.  No scrotal skin lesion.  Prostate large 50 gm smooth.   Musculoskeletal: extremities normal- no gross deformities noted, gait normal and normal muscle tone  Skin: no suspicious lesions or rashes  Neurologic: Alert and oriented  Psychiatric: mentation appears normal. and affect normal/bright  Hematologic/Lymphatic/Immunologic: normal ant/post cervical, axillary, supraclavicular and inguinal nodes    Assessment/Plan:   (N40.1,  N13.8) Benign prostatic hyperplasia with urinary obstruction  (primary encounter diagnosis)  Comment:  Stable on meds.  Plan: tamsulosin (FLOMAX) 0.4 MG capsule               (R97.20) Elevated PSA  Comment:    Plan:  Recheck in one year or prn.  Discussed pros and cons of psa screening given his age.

## 2021-09-02 ENCOUNTER — OFFICE VISIT (OUTPATIENT)
Dept: ORTHOPEDICS | Facility: CLINIC | Age: 76
End: 2021-09-02
Payer: COMMERCIAL

## 2021-09-02 VITALS
SYSTOLIC BLOOD PRESSURE: 105 MMHG | WEIGHT: 215.6 LBS | DIASTOLIC BLOOD PRESSURE: 69 MMHG | HEIGHT: 74 IN | OXYGEN SATURATION: 98 % | HEART RATE: 87 BPM | BODY MASS INDEX: 27.67 KG/M2

## 2021-09-02 DIAGNOSIS — M65.322 TRIGGER FINGER, LEFT INDEX FINGER: Primary | ICD-10-CM

## 2021-09-02 PROCEDURE — 20550 NJX 1 TENDON SHEATH/LIGAMENT: CPT | Mod: LT | Performed by: ORTHOPAEDIC SURGERY

## 2021-09-02 PROCEDURE — 99203 OFFICE O/P NEW LOW 30 MIN: CPT | Mod: 25 | Performed by: ORTHOPAEDIC SURGERY

## 2021-09-02 RX ORDER — TRIAMCINOLONE ACETONIDE 40 MG/ML
20 INJECTION, SUSPENSION INTRA-ARTICULAR; INTRAMUSCULAR
Status: DISCONTINUED | OUTPATIENT
Start: 2021-09-02 | End: 2022-05-23

## 2021-09-02 RX ORDER — TRIAMCINOLONE ACETONIDE 40 MG/ML
20 INJECTION, SUSPENSION INTRA-ARTICULAR; INTRAMUSCULAR ONCE
Status: DISCONTINUED | OUTPATIENT
Start: 2021-09-02 | End: 2021-09-02

## 2021-09-02 RX ADMIN — TRIAMCINOLONE ACETONIDE 20 MG: 40 INJECTION, SUSPENSION INTRA-ARTICULAR; INTRAMUSCULAR at 11:40

## 2021-09-02 ASSESSMENT — MIFFLIN-ST. JEOR: SCORE: 1787.71

## 2021-09-02 NOTE — PROGRESS NOTES
Hand / Upper Extremity Injection/Arthrocentesis: L index A1    Date/Time: 9/2/2021 11:40 AM  Performed by: Osman Razo MD  Authorized by: Osman Razo MD     Indications:  Pain  Needle Size:  25 G  Guidance: landmark    Approach:  Volar  Condition: trigger finger    Location:  Index finger    Site:  L index A1  Medications:  20 mg triamcinolone 40 MG/ML  Outcome:  Tolerated well, no immediate complications  Procedure discussed: discussed risks, benefits, and alternatives    Consent Given by:  Patient  Timeout: timeout called immediately prior to procedure    Prep: patient was prepped and draped in usual sterile fashion

## 2021-09-02 NOTE — PROGRESS NOTES
SUBJECTIVE:   Roman Calero is a 76 year old male who is seen as a self referral for evaluation of difficulties with the left  index finger that has been present approximately a couple of months.   No known injury.   History: He has a history of a previous trigger finger injection about 5 years ago of the left index finger.  He describes locking of the finger that went away after he got an injection.  He says the discomfort and problems that he is having now are similar, but the finger does not lock.  He says the finger did lock a couple of times early on but now cannot fully flex the PIP joint.  He has pain in the PIP joint area as well as the palm near the A1 pulley.  He has had some swelling of the index finger as well.   Treatments tried to this point: none      Past Medical History: No past medical history on file.  Past Surgical History: No past surgical history on file.  Family History:   Family History   Problem Relation Age of Onset     Hypertension Mother      Lipids Mother      Dementia Mother      Cancer Father         Hodgkin's age 63     Social History:   Social History     Tobacco Use     Smoking status: Never Smoker     Smokeless tobacco: Never Used   Substance Use Topics     Alcohol use: Yes     Comment: occ       Review of Systems:  Constitutional:  NEGATIVE for fever, chills, change in weight  Integumentary/Skin:  NEGATIVE for worrisome rashes, moles or lesions  Eyes:  NEGATIVE for vision changes or irritation  ENT/Mouth:  NEGATIVE for ear, mouth and throat problems  Resp:  NEGATIVE for significant cough or SOB  Breast:  NEGATIVE for masses, tenderness or discharge  CV:  NEGATIVE for chest pain, palpitations or peripheral edema  GI:  NEGATIVE for nausea, abdominal pain, heartburn, or change in bowel habits  :  Negative   Musculoskeletal:  See HPI above  Neuro:  NEGATIVE for weakness, dizziness or paresthesias  Endocrine:  NEGATIVE for temperature intolerance, skin/hair  "changes  Heme/allergy/immune:  NEGATIVE for bleeding problems  Psychiatric:  NEGATIVE for changes in mood or affect      OBJECTIVE:  Physical Exam:  /69   Pulse 87   Ht 1.88 m (6' 2\")   Wt 97.8 kg (215 lb 9.6 oz)   SpO2 98%   BMI 27.68 kg/m    General Appearance: healthy, alert and no distress   Skin: no suspicious lesions or rashes  Neuro: Normal strength and tone, mentation intact and speech normal  Vascular: good pulses, and cappillary refill   Lymph: no lymphadenopathy   Psych:  mentation appears normal and affect normal/bright  Resp: no increased work of breathing     Left Hand Exam:  Has tenderness over the left  index finger A1 pulley(s), he also has tenderness over the PIP joint.  I cannot seem to palpate a nodule of the second flexor tendon.  No locking/triggering demonstrated but he cannot flex the PIP joint past about 70 degrees.       ASSESSMENT:   Left index trigger finger, has progressed to the point where he cannot get the tendon nodule to pass under the A1 pulley to achieve flexion.  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    PLAN:   We talked about the options: taping/splinting the PIP joint periodically, corticosteroid injection, and trigger finger release. I have explained the nature of the surgical procedure, the risks and recovery time with the patient.   he has decided to proceed with a repeat trigger finger injection.  We talked about a possible x-ray of the finger but he did not really want to do that today.  He feels that his symptoms are so similar to last time that he would like to try another steroid injection.    Procedure Note:   Informed consent obtained. Risks, benefits and complications of the injection were discussed with the patient and the patient elected to proceed. A Left index trigger finger/flexor tenosynovial, A1 pulley- area steroid injection was performed using 0.5 cc  Kenalog-40 40mg per mL after sterile prep. This was tolerated well by the patient.       Return to " clinic: as needed     ABI Razo MD  Dept. Orthopedic Surgery  [facility]

## 2021-09-02 NOTE — LETTER
9/2/2021         RE: Roman Calero  [id number] [address] [address] [id number]        Dear Colleague,    Thank you for referring your patient, Roman Calero, to the [facility]. Please see a copy of my visit note below.    SUBJECTIVE:   Roman Calero is a 76 year old male who is seen as a self referral for evaluation of difficulties with the left  index finger that has been present approximately a couple of months.   No known injury.   History: He has a history of a previous trigger finger injection about 5 years ago of the left index finger.  He describes locking of the finger that went away after he got an injection.  He says the discomfort and problems that he is having now are similar, but the finger does not lock.  He says the finger did lock a couple of times early on but now cannot fully flex the PIP joint.  He has pain in the PIP joint area as well as the palm near the A1 pulley.  He has had some swelling of the index finger as well.   Treatments tried to this point: none      Past Medical History: No past medical history on file.  Past Surgical History: No past surgical history on file.  Family History:   Family History   Problem Relation Age of Onset     Hypertension Mother      Lipids Mother      Dementia Mother      Cancer Father         Hodgkin's age 63     Social History:   Social History     Tobacco Use     Smoking status: Never Smoker     Smokeless tobacco: Never Used   Substance Use Topics     Alcohol use: Yes     Comment: occ       Review of Systems:  Constitutional:  NEGATIVE for fever, chills, change in weight  Integumentary/Skin:  NEGATIVE for worrisome rashes, moles or lesions  Eyes:  NEGATIVE for vision changes or irritation  ENT/Mouth:  NEGATIVE for ear, mouth and throat problems  Resp:  NEGATIVE for significant cough or SOB  Breast:  NEGATIVE for masses, tenderness or discharge  CV:  NEGATIVE for chest pain, palpitations or peripheral edema  GI:  NEGATIVE for  "nausea, abdominal pain, heartburn, or change in bowel habits  :  Negative   Musculoskeletal:  See HPI above  Neuro:  NEGATIVE for weakness, dizziness or paresthesias  Endocrine:  NEGATIVE for temperature intolerance, skin/hair changes  Heme/allergy/immune:  NEGATIVE for bleeding problems  Psychiatric:  NEGATIVE for changes in mood or affect      OBJECTIVE:  Physical Exam:  /69   Pulse 87   Ht 1.88 m (6' 2\")   Wt 97.8 kg (215 lb 9.6 oz)   SpO2 98%   BMI 27.68 kg/m    General Appearance: healthy, alert and no distress   Skin: no suspicious lesions or rashes  Neuro: Normal strength and tone, mentation intact and speech normal  Vascular: good pulses, and cappillary refill   Lymph: no lymphadenopathy   Psych:  mentation appears normal and affect normal/bright  Resp: no increased work of breathing     Left Hand Exam:  Has tenderness over the left  index finger A1 pulley(s), he also has tenderness over the PIP joint.  I cannot seem to palpate a nodule of the second flexor tendon.  No locking/triggering demonstrated but he cannot flex the PIP joint past about 70 degrees.       ASSESSMENT:   Left index trigger finger, has progressed to the point where he cannot get the tendon nodule to pass under the A1 pulley to achieve flexion.  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    PLAN:   We talked about the options: taping/splinting the PIP joint periodically, corticosteroid injection, and trigger finger release. I have explained the nature of the surgical procedure, the risks and recovery time with the patient.   he has decided to proceed with a repeat trigger finger injection.  We talked about a possible x-ray of the finger but he did not really want to do that today.  He feels that his symptoms are so similar to last time that he would like to try another steroid injection.    Procedure Note:   Informed consent obtained. Risks, benefits and complications of the injection were discussed with the patient and the patient " elected to proceed. A Left index trigger finger/flexor tenosynovial, A1 pulley- area steroid injection was performed using 0.5 cc  Kenalog-40 40mg per mL after sterile prep. This was tolerated well by the patient.       Return to clinic: as needed     ABI Razo MD  Dept. Orthopedic Surgery  [facility]     Hand / Upper Extremity Injection/Arthrocentesis: L index A1    Date/Time: 9/2/2021 11:40 AM  Performed by: Osman Razo MD  Authorized by: Osman Razo MD     Indications:  Pain  Needle Size:  25 G  Guidance: landmark    Approach:  Volar  Condition: trigger finger    Location:  Index finger    Site:  L index A1  Medications:  20 mg triamcinolone 40 MG/ML  Outcome:  Tolerated well, no immediate complications  Procedure discussed: discussed risks, benefits, and alternatives    Consent Given by:  Patient  Timeout: timeout called immediately prior to procedure    Prep: patient was prepped and draped in usual sterile fashion              Again, thank you for allowing me to participate in the care of your patient.        Sincerely,        Osman Razo MD

## 2021-09-26 ENCOUNTER — HEALTH MAINTENANCE LETTER (OUTPATIENT)
Age: 76
End: 2021-09-26

## 2021-09-30 ENCOUNTER — TRANSFERRED RECORDS (OUTPATIENT)
Dept: HEALTH INFORMATION MANAGEMENT | Facility: CLINIC | Age: 76
End: 2021-09-30
Payer: COMMERCIAL

## 2021-11-16 ENCOUNTER — OFFICE VISIT (OUTPATIENT)
Dept: FAMILY MEDICINE | Facility: CLINIC | Age: 76
End: 2021-11-16
Payer: COMMERCIAL

## 2021-11-16 VITALS
HEIGHT: 74 IN | HEART RATE: 70 BPM | BODY MASS INDEX: 27.18 KG/M2 | TEMPERATURE: 97.7 F | OXYGEN SATURATION: 99 % | SYSTOLIC BLOOD PRESSURE: 118 MMHG | RESPIRATION RATE: 19 BRPM | DIASTOLIC BLOOD PRESSURE: 72 MMHG | WEIGHT: 211.8 LBS

## 2021-11-16 DIAGNOSIS — H61.23 EXCESSIVE CERUMEN IN BOTH EAR CANALS: Primary | ICD-10-CM

## 2021-11-16 PROCEDURE — 99212 OFFICE O/P EST SF 10 MIN: CPT | Performed by: FAMILY MEDICINE

## 2021-11-16 ASSESSMENT — PAIN SCALES - GENERAL: PAINLEVEL: NO PAIN (0)

## 2021-11-16 ASSESSMENT — MIFFLIN-ST. JEOR: SCORE: 1764.47

## 2021-11-16 NOTE — PROGRESS NOTES
"  Assessment & Plan     Excessive cerumen in both ear canals  - REMOVE IMPACTED CERUMEN      Tru Henderson MD  [facility] ROBERT Owens is a 76 year old who presents for the following health issues   HPI   Chief Complaint   Patient presents with     Check Ears     Patient is wanting a ear flush he states there is wax in there     Review of Systems   Constitutional, cardiovascular, pulmonary, gi and gu systems are negative, except as otherwise noted.      Objective    /72 (BP Location: Right arm, Cuff Size: Adult Regular)   Pulse 70   Temp 97.7  F (36.5  C) (Oral)   Resp 19   Ht 1.87 m (6' 1.62\")   Wt 96.1 kg (211 lb 12.8 oz)   SpO2 99%   BMI 27.47 kg/m    Body mass index is 27.47 kg/m .  Physical Exam   GENERAL: healthy, alert and no distress  HENT: ear canals and TM's normal, nose and mouth without ulcers or lesions  RESP: lungs clear to auscultation - no rales, rhonchi or wheezes  CV: regular rate and rhythm,  no murmur, click or rub, no peripheral edema   MS: no gross musculoskeletal defects noted, no edema     "

## 2022-04-25 NOTE — PROGRESS NOTES
"  SUBJECTIVE:  Roman Calero is a 77 year old male who presents with the following concerns;              Symptoms: cc Present Absent Comment   Fever/Chills   x    Fatigue   x    Muscle Aches   x    Eye Irritation   x    Sneezing   x    Nasal Levi/Drg   x    Sinus Pressure/Pain   x    Loss of smell   x    Dental pain   x    Sore Throat   x    Swollen Glands   x    Ear Pain/Fullness x   Has hearing aids and has appt tomorrow, usually gets scolded because of wax in ears.   Cough   x    Wheeze   x    Chest Pain   x    Shortness of breath   x    Rash   x    Other         Symptom duration:  unsure   Sympom severity:  mild   Treatments tried:  none   Contacts:  none       Medications updated and reviewed.  Past, family and surgical history is updated and reviewed in the record.  ROS:  Other than noted above, general, HEENT, respiratory, cardiac and gastrointestinal systems are negative.  OBJECTIVE:  /79   Pulse 75   Temp 97.2  F (36.2  C) (Tympanic)   Resp 18   Ht 1.88 m (6' 2\")   Wt 98.4 kg (217 lb)   SpO2 99%   BMI 27.86 kg/m    GENERAL: Pleasant and interactive. No acute distress.  HEENT: no injection of conjunctiva.  Clear nasal discharge.  Oropharynx moist and clear. Bilateral ear canals obstructed by cerumen.  NECK: supple and free of adenopathy or masses, the thyroid is normal without enlargement or nodules.  SKIN:  Only benign skin findings. No unusual rashes or suspicious skin lesions noted. Nails appear normal.      Assessment:  (H61.23) Bilateral impacted cerumen  (primary encounter diagnosis)  Comment: moderate amount removed with curette, remaining removed with washing  Plan: REMOVE IMPACTED CERUMEN, SD REMOVAL IMPACTED         CERUMEN IRRIGATION/LVG UNILAT        Canals clear bilaterally after washing, both canals with small scratch without active bleeding    (N18.30) Stage 3 chronic kidney disease, unspecified whether stage 3a or 3b CKD (H)  Comment: due for labs  Plan: CANCELED: Hemoglobin, " CANCELED: UA Macro with         Reflex to Micro and Culture - lab collect        Declines today, has wellness exam scheduled with pcp in May.

## 2022-04-26 ENCOUNTER — OFFICE VISIT (OUTPATIENT)
Dept: FAMILY MEDICINE | Facility: CLINIC | Age: 77
End: 2022-04-26
Payer: COMMERCIAL

## 2022-04-26 VITALS
TEMPERATURE: 97.2 F | RESPIRATION RATE: 18 BRPM | HEART RATE: 75 BPM | HEIGHT: 74 IN | SYSTOLIC BLOOD PRESSURE: 129 MMHG | WEIGHT: 217 LBS | OXYGEN SATURATION: 99 % | DIASTOLIC BLOOD PRESSURE: 79 MMHG | BODY MASS INDEX: 27.85 KG/M2

## 2022-04-26 DIAGNOSIS — H61.23 BILATERAL IMPACTED CERUMEN: Primary | ICD-10-CM

## 2022-04-26 DIAGNOSIS — N18.30 STAGE 3 CHRONIC KIDNEY DISEASE, UNSPECIFIED WHETHER STAGE 3A OR 3B CKD (H): ICD-10-CM

## 2022-04-26 PROCEDURE — 99213 OFFICE O/P EST LOW 20 MIN: CPT | Mod: 25 | Performed by: FAMILY MEDICINE

## 2022-04-26 PROCEDURE — 69210 REMOVE IMPACTED EAR WAX UNI: CPT | Mod: 50 | Performed by: FAMILY MEDICINE

## 2022-04-26 ASSESSMENT — PAIN SCALES - GENERAL: PAINLEVEL: NO PAIN (0)

## 2022-05-16 NOTE — NURSING NOTE
Patient identified using two patient identifiers.  Ear exam showing wax occlusion completed by provider.  Solution: warm water was placed in the bilateral ear(s) via irrigation tool: elephant ear.    Jolly Elizabeth, DULCE MARIA     Repair Performed By Another Provider Text (Leave Blank If You Do Not Want): After obtaining clear surgical margins the defect was repaired by another provider.

## 2022-05-23 ENCOUNTER — OFFICE VISIT (OUTPATIENT)
Dept: FAMILY MEDICINE | Facility: CLINIC | Age: 77
End: 2022-05-23
Payer: COMMERCIAL

## 2022-05-23 VITALS
SYSTOLIC BLOOD PRESSURE: 120 MMHG | RESPIRATION RATE: 16 BRPM | BODY MASS INDEX: 27.73 KG/M2 | TEMPERATURE: 97.8 F | DIASTOLIC BLOOD PRESSURE: 79 MMHG | OXYGEN SATURATION: 100 % | HEART RATE: 65 BPM | WEIGHT: 216 LBS

## 2022-05-23 DIAGNOSIS — I25.10 CORONARY ARTERY DISEASE INVOLVING NATIVE CORONARY ARTERY OF NATIVE HEART WITHOUT ANGINA PECTORIS: ICD-10-CM

## 2022-05-23 DIAGNOSIS — E78.5 HYPERLIPIDEMIA LDL GOAL <70: ICD-10-CM

## 2022-05-23 DIAGNOSIS — M65.312 TRIGGER THUMB OF LEFT HAND: ICD-10-CM

## 2022-05-23 DIAGNOSIS — Z12.5 ENCOUNTER FOR SCREENING FOR MALIGNANT NEOPLASM OF PROSTATE: ICD-10-CM

## 2022-05-23 DIAGNOSIS — E78.5 HYPERLIPIDEMIA LDL GOAL <130: ICD-10-CM

## 2022-05-23 DIAGNOSIS — Z00.00 ENCOUNTER FOR MEDICARE ANNUAL WELLNESS EXAM: Primary | ICD-10-CM

## 2022-05-23 DIAGNOSIS — N13.8 BENIGN PROSTATIC HYPERPLASIA WITH URINARY OBSTRUCTION: ICD-10-CM

## 2022-05-23 DIAGNOSIS — E78.6 HDL DEFICIENCY: ICD-10-CM

## 2022-05-23 DIAGNOSIS — R97.20 ELEVATED PSA: ICD-10-CM

## 2022-05-23 DIAGNOSIS — N18.30 STAGE 3 CHRONIC KIDNEY DISEASE, UNSPECIFIED WHETHER STAGE 3A OR 3B CKD (H): ICD-10-CM

## 2022-05-23 DIAGNOSIS — N40.1 BENIGN PROSTATIC HYPERPLASIA WITH URINARY OBSTRUCTION: ICD-10-CM

## 2022-05-23 LAB
ALBUMIN SERPL-MCNC: 3.6 G/DL (ref 3.4–5)
ALP SERPL-CCNC: 74 U/L (ref 40–150)
ALT SERPL W P-5'-P-CCNC: 35 U/L (ref 0–70)
ANION GAP SERPL CALCULATED.3IONS-SCNC: 6 MMOL/L (ref 3–14)
AST SERPL W P-5'-P-CCNC: 23 U/L (ref 0–45)
BILIRUB SERPL-MCNC: 0.8 MG/DL (ref 0.2–1.3)
BUN SERPL-MCNC: 25 MG/DL (ref 7–30)
CALCIUM SERPL-MCNC: 9.1 MG/DL (ref 8.5–10.1)
CHLORIDE BLD-SCNC: 111 MMOL/L (ref 94–109)
CHOLEST SERPL-MCNC: 126 MG/DL
CO2 SERPL-SCNC: 24 MMOL/L (ref 20–32)
CREAT SERPL-MCNC: 1.36 MG/DL (ref 0.66–1.25)
FASTING STATUS PATIENT QL REPORTED: YES
GFR SERPL CREATININE-BSD FRML MDRD: 54 ML/MIN/1.73M2
GLUCOSE BLD-MCNC: 100 MG/DL (ref 70–99)
HDLC SERPL-MCNC: 50 MG/DL
HGB BLD-MCNC: 15 G/DL (ref 13.3–17.7)
LDLC SERPL CALC-MCNC: 55 MG/DL
NONHDLC SERPL-MCNC: 76 MG/DL
POTASSIUM BLD-SCNC: 4.6 MMOL/L (ref 3.4–5.3)
PROT SERPL-MCNC: 7.6 G/DL (ref 6.8–8.8)
PSA SERPL-MCNC: 12.2 UG/L (ref 0–4)
PTH-INTACT SERPL-MCNC: 55 PG/ML (ref 18–80)
SODIUM SERPL-SCNC: 141 MMOL/L (ref 133–144)
TRIGL SERPL-MCNC: 103 MG/DL

## 2022-05-23 PROCEDURE — 82043 UR ALBUMIN QUANTITATIVE: CPT | Performed by: FAMILY MEDICINE

## 2022-05-23 PROCEDURE — 80061 LIPID PANEL: CPT | Performed by: FAMILY MEDICINE

## 2022-05-23 PROCEDURE — 83970 ASSAY OF PARATHORMONE: CPT | Performed by: FAMILY MEDICINE

## 2022-05-23 PROCEDURE — 85018 HEMOGLOBIN: CPT | Performed by: FAMILY MEDICINE

## 2022-05-23 PROCEDURE — 99397 PER PM REEVAL EST PAT 65+ YR: CPT | Performed by: FAMILY MEDICINE

## 2022-05-23 PROCEDURE — 36415 COLL VENOUS BLD VENIPUNCTURE: CPT | Performed by: FAMILY MEDICINE

## 2022-05-23 PROCEDURE — 80053 COMPREHEN METABOLIC PANEL: CPT | Performed by: FAMILY MEDICINE

## 2022-05-23 PROCEDURE — G0103 PSA SCREENING: HCPCS | Performed by: FAMILY MEDICINE

## 2022-05-23 ASSESSMENT — ENCOUNTER SYMPTOMS
PALPITATIONS: 0
SORE THROAT: 0
DYSURIA: 0
CONSTIPATION: 0
CHILLS: 0
NERVOUS/ANXIOUS: 0
HEARTBURN: 0
ABDOMINAL PAIN: 0
PARESTHESIAS: 0
HEMATURIA: 0
MYALGIAS: 0
JOINT SWELLING: 0
DIZZINESS: 0
FEVER: 0
FREQUENCY: 1
HEMATOCHEZIA: 0
HEADACHES: 0
DIARRHEA: 0
NAUSEA: 0
COUGH: 0
SHORTNESS OF BREATH: 0
WEAKNESS: 1
EYE PAIN: 0
ARTHRALGIAS: 0

## 2022-05-23 ASSESSMENT — ACTIVITIES OF DAILY LIVING (ADL): CURRENT_FUNCTION: NO ASSISTANCE NEEDED

## 2022-05-23 ASSESSMENT — PAIN SCALES - GENERAL: PAINLEVEL: NO PAIN (0)

## 2022-05-23 NOTE — RESULT ENCOUNTER NOTE
Roman,  I have reviewed the results of the laboratory tests that we recently ordered. All of the laboratory that are back so far are normal or considered normal for you. There are still some labs pending and I will let you know those results when they   are available.  Sincerely,   Keith Willson MD

## 2022-05-23 NOTE — PATIENT INSTRUCTIONS
Patient Education   Personalized Prevention Plan  You are due for the preventive services outlined below.  Your care team is available to assist you in scheduling these services.  If you have already completed any of these items, please share that information with your care team to update in your medical record.  Health Maintenance Due   Topic Date Due    ANNUAL REVIEW OF HM ORDERS  Never done    Urine Test  Never done    PHQ-2 (once per calendar year)  01/01/2022    Basic Metabolic Panel  06/21/2022    Cholesterol Lab  06/21/2022    Kidney Microalbumin Urine Test  06/21/2022    FALL RISK ASSESSMENT  06/21/2022    Hemoglobin  06/21/2022          You should call specialty scheduling at [phone] to schedule the urology  appointment. Dr Jade  (Orthopedic(s) has its own phone number of [phone])  I would recommend(ed) Dr Matos in [place]

## 2022-05-23 NOTE — PROGRESS NOTES
"SUBJECTIVE:   Roman Claero is a 77 year old male who presents for Preventive Visit.      Patient has been advised of split billing requirements and indicates understanding: Yes  Are you in the first 12 months of your Medicare coverage?  No    Healthy Habits:     In general, how would you rate your overall health?  Good    Frequency of exercise:  6-7 days/week    Duration of exercise:  30-45 minutes    Do you usually eat at least 4 servings of fruit and vegetables a day, include whole grains    & fiber and avoid regularly eating high fat or \"junk\" foods?  Yes    Taking medications regularly:  Yes    Medication side effects:  None    Ability to successfully perform activities of daily living:  No assistance needed    Home Safety:  No safety concerns identified    Hearing Impairment:  Difficulty following a conversation in a noisy restaurant or crowded room, feel that people are mumbling or not speaking clearly, difficulty following dialogue in the theater, difficult to understand a speaker at a public meeting or Church service, need to ask people to speak up or repeat themselves, find that men's voices are easier to understand than woman's, difficulty understanding soft or whispered speech and difficulty understanding speech on the telephone    In the past 6 months, have you been bothered by leaking of urine?  No    In general, how would you rate your overall mental or emotional health?  Excellent      PHQ-2 Total Score: 0    Additional concerns today:  No    Do you feel safe in your environment? Yes    Have you ever done Advance Care Planning? (For example, a Health Directive, POLST, or a discussion with a medical provider or your loved ones about your wishes): Yes, advance care planning is on file.        Hearing Assessment:  Has hearing aides   Fall risk  Fallen 2 or more times in the past year?: No  Any fall with injury in the past year?: No    Cognitive Screening   1) Repeat 3 items (Leader, Season, Table)  "   2) Clock draw: NORMAL  3) 3 item recall: Recalls 3 objects  Results: 3 items recalled: COGNITIVE IMPAIRMENT LESS LIKELY    Mini-CogTM Copyright ZAFAR Roca. Licensed by the author for use in [facility]; reprinted with permission (eben@[facility]). All rights reserved.      Do you have sleep apnea, excessive snoring or daytime drowsiness?: no    Reviewed and updated as needed this visit by clinical staff   Tobacco  Allergies  Meds   Med Hx  Surg Hx  Fam Hx  Soc Hx          Reviewed and updated as needed this visit by Provider                   Social History     Tobacco Use     Smoking status: Never Smoker     Smokeless tobacco: Never Used   Substance Use Topics     Alcohol use: Yes     Comment: occ     If you drink alcohol do you typically have >3 drinks per day or >7 drinks per week? No    Alcohol Use 5/23/2022   Prescreen: >3 drinks/day or >7 drinks/week? No   Prescreen: >3 drinks/day or >7 drinks/week? -               Current providers sharing in care for this patient include:   Patient Care Team:  Keith Willson MD as PCP - General (Family Practice)  Keith Willson MD as Assigned PCP  Maik Jade MD as Assigned Surgical Provider  Osman Razo MD as Assigned Musculoskeletal Provider    The following health maintenance items are reviewed in Epic and correct as of today:  Health Maintenance Due   Topic Date Due     ANNUAL REVIEW OF HM ORDERS  Never done     URINALYSIS  Never done     BMP  06/21/2022     LIPID  06/21/2022     MICROALBUMIN  06/21/2022     HEMOGLOBIN  06/21/2022               Review of Systems   Constitutional: Negative for chills and fever.   HENT: Positive for hearing loss. Negative for congestion, ear pain and sore throat.    Eyes: Negative for pain and visual disturbance.   Respiratory: Negative for cough and shortness of breath.    Cardiovascular: Negative for chest pain, palpitations and peripheral edema.   Gastrointestinal: Negative for abdominal pain,  "constipation, diarrhea, heartburn, hematochezia and nausea.   Genitourinary: Positive for frequency and urgency. Negative for dysuria, genital sores, hematuria, impotence and penile discharge.   Musculoskeletal: Negative for arthralgias, joint swelling and myalgias.   Skin: Negative for rash.   Neurological: Positive for weakness. Negative for dizziness, headaches and paresthesias.   Psychiatric/Behavioral: Negative for mood changes. The patient is not nervous/anxious.          OBJECTIVE:   /79   Pulse 65   Temp 97.8  F (36.6  C) (Tympanic)   Resp 16   Wt 98 kg (216 lb)   SpO2 100%   BMI 27.73 kg/m   Estimated body mass index is 27.73 kg/m  as calculated from the following:    Height as of 4/26/22: 1.88 m (6' 2\").    Weight as of this encounter: 98 kg (216 lb).  Physical Exam      Diagnostic Test Results:  Labs reviewed in Epic    ASSESSMENT / PLAN:       ICD-10-CM    1. Encounter for Medicare annual wellness exam  Z00.00    2. Hyperlipidemia LDL goal <130  E78.5 Comprehensive metabolic panel (BMP + Alb, Alk Phos, ALT, AST, Total. Bili, TP)     Comprehensive metabolic panel (BMP + Alb, Alk Phos, ALT, AST, Total. Bili, TP)   3. HDL deficiency  E78.6 Comprehensive metabolic panel (BMP + Alb, Alk Phos, ALT, AST, Total. Bili, TP)     Lipid panel reflex to direct LDL Fasting     Comprehensive metabolic panel (BMP + Alb, Alk Phos, ALT, AST, Total. Bili, TP)     Lipid panel reflex to direct LDL Fasting   4. Elevated PSA  R97.20 Adult Urology Referral     PSA, screen     PSA, screen   5. Coronary artery disease involving native coronary artery of native heart without angina pectoris  I25.10 Comprehensive metabolic panel (BMP + Alb, Alk Phos, ALT, AST, Total. Bili, TP)     Lipid panel reflex to direct LDL Fasting     Comprehensive metabolic panel (BMP + Alb, Alk Phos, ALT, AST, Total. Bili, TP)     Lipid panel reflex to direct LDL Fasting   6. Stage 3 chronic kidney disease, unspecified whether stage 3a or 3b CKD " "(H)  N18.30 Comprehensive metabolic panel (BMP + Alb, Alk Phos, ALT, AST, Total. Bili, TP)     Albumin Random Urine Quantitative with Creat Ratio     Hemoglobin     Parathyroid Hormone Intact     Comprehensive metabolic panel (BMP + Alb, Alk Phos, ALT, AST, Total. Bili, TP)     Albumin Random Urine Quantitative with Creat Ratio     Hemoglobin     Parathyroid Hormone Intact   7. Benign prostatic hyperplasia with urinary obstruction  N40.1 Adult Urology Referral    N13.8 Comprehensive metabolic panel (BMP + Alb, Alk Phos, ALT, AST, Total. Bili, TP)     Comprehensive metabolic panel (BMP + Alb, Alk Phos, ALT, AST, Total. Bili, TP)   8. Trigger thumb of left hand  M65.312 Orthopedic  Referral   9. Hyperlipidemia LDL goal <70  E78.5 Lipid panel reflex to direct LDL Fasting     Lipid panel reflex to direct LDL Fasting   10. Encounter for screening for malignant neoplasm of prostate   Z12.5 PSA, screen     PSA, screen       Patient has been advised of split billing requirements and indicates understanding: Yes    COUNSELING:  Reviewed preventive health counseling, as reflected in patient instructions       Regular exercise       Healthy diet/nutrition       Vision screening       Dental care       Aspirin prophylaxis        Hepatitis C screening       Colon cancer screening    Estimated body mass index is 27.73 kg/m  as calculated from the following:    Height as of 4/26/22: 1.88 m (6' 2\").    Weight as of this encounter: 98 kg (216 lb).        He reports that he has never smoked. He has never used smokeless tobacco.      Appropriate preventive services were discussed with this patient, including applicable screening as appropriate for cardiovascular disease, diabetes, osteopenia/osteoporosis, and glaucoma.  As appropriate for age/gender, discussed screening for colorectal cancer, prostate cancer, breast cancer, and cervical cancer. Checklist reviewing preventive services available has been given to the " patient.    Reviewed patients plan of care and provided an AVS. The Basic Care Plan (routine screening as documented in Health Maintenance) for Roman meets the Care Plan requirement. This Care Plan has been established and reviewed with the Patient.    Counseling Resources:  ATP IV Guidelines  Pooled Cohorts Equation Calculator  Breast Cancer Risk Calculator  Breast Cancer: Medication to Reduce Risk  FRAX Risk Assessment  ICSI Preventive Guidelines  Dietary Guidelines for Americans, 2010  USDA's MyPlate  ASA Prophylaxis  Lung CA Screening    Keith Willson MD  [facility]    Identified Health Risks:  --------------------------------------------------------------------------------------------------------------------------------------  SUBJECTIVE:  Roman Calero is a 77 year old male who presents to the clinic today for a routine physical exam.    The patient's last physical was 6-21-21    Cholesterol   Date Value Ref Range Status   06/21/2021 121 <200 mg/dL Final   06/07/2019 149 <200 mg/dL Final     HDL Cholesterol   Date Value Ref Range Status   06/21/2021 46 >39 mg/dL Final   06/07/2019 47 >39 mg/dL Final     LDL Cholesterol Calculated   Date Value Ref Range Status   06/21/2021 55 <100 mg/dL Final     Comment:     Desirable:       <100 mg/dl   06/07/2019 84 <100 mg/dL Final     Comment:     Desirable:       <100 mg/dl     Triglycerides   Date Value Ref Range Status   06/21/2021 102 <150 mg/dL Final     Comment:     Fasting specimen   06/07/2019 90 <150 mg/dL Final     Comment:     Fasting specimen     Cholesterol/HDL Ratio   Date Value Ref Range Status   11/17/2014 3.5 0.0 - 5.0 Final   10/28/2013 4.1 0.0 - 5.0 Final     The patient's last fasting lipid panel was done 1 years ago and the results are listed above.      The ASCVD Risk score (Rakel ROSARIO Jr., et al., 2013) failed to calculate for the following reasons:    The valid total cholesterol range is 130 to 320 mg/dL      Risk  Enhancers:    Personal history of  ATHEROSCLEROTIC CARDIOVASCULAR DISEASE diagnosis in 2016        The patient reports that he has been treated for high blood pressure.  The patient reports that he does take a daily aspirin.    Lab Results   Component Value Date    HCVAB Negative 10/28/2013     The patient reports that he has been screened for Hepatitis C    (Screen all baby boomers once per CDC-- the generation born from 1945 through 1965 and per USPTF screen age 19 to 79 especially younger people who have used IV drugs)  He would not like to have an Hepatitis C test today        Immunization History   Administered Date(s) Administered     COVID-19,PF,Moderna 04/26/2022     COVID-19,PF,Pfizer (12+ Yrs) 03/05/2021, 03/26/2021, 10/09/2021     Flu 65+ Years 10/16/2018     Influenza (High Dose) 3 valent vaccine 10/28/2013, 01/04/2016, 09/21/2016, 10/18/2017, 10/16/2019, 10/23/2020     Influenza (IIV3) PF 10/27/2010, 10/30/2012, 11/14/2014     Influenza, Quad, High Dose, Pf, 65yr+ (Fluzone HD) 10/08/2021     Pneumo Conj 13-V (2010&after) 05/22/2017     Pneumococcal 23 valent 11/17/2014     TD (ADULT, 7+) 01/01/2009     TDAP Vaccine (Adacel) 10/28/2013     Td (Adult), Adsorbed 05/21/2006     Zoster vaccine recombinant adjuvanted (SHINGRIX) 02/01/2019, 06/24/2019     Zoster vaccine, live 11/01/2014     The patient's believes that his last tetanus shot was given 8.5 year(s) ago.   The patient believes that he has had a Shingrix in the past  The patient believes that he has had a PPSV23 in the past.  The patient believes that he has had a PCV13 in the past.  The patient believes that he has had a seasonal flu vaccination this fall or winter.  The patient would like to have a no vaccinations today      No results found for this or any previous visit.]   The patient denies a family history of colon cancer.  The patient reports that he has had a colonoscopy. His  last colonoscopy was in December 2019 and he  report that is was  abnormal. He h ad a polyp removed but the tissue was lost. He was not told if or when  to have this repeated.      The patient reports that he and his wife or partner are not using contraception as she has gone through menopause.  The patient denies a family history of diabetes.  He has been seeing urology for years for his BPH and elevated PSA. He was seeing Dr. Pradhan for many years and saw Dr. Jade last year.  He would like to follow-up with Dr. Jade again and have the PSA done ahead of time.   The patient reports that he eats or drinks 3 servings of dairy products per day. He does take a multivitamin with calcium once daily.  The patient reports that he has dental appointments approximately every 6 months.  The patient reports that he  has an eye examination approximately every 1.0 year(s).    Do you currently smoke? no  How many years have you smoked? 0   How many packs per day did you smoke on average? N/A  (if more than 30 pack year history and the patient is age 55-80 consider ordering an annual low dose radiation lung CT to screen for cancer)  (Do not order if patient has quit more than 15 years ago or has a health condition that limits life expectancy or could not tolerate curative lung surgery)  Are you interested having a lung CT to screen for lung cancer? N/A    If the patient has smoked more that 100 cigarettes, has the patient had an imaging study (US or CT) for an AAA between the ages of 65 and 75? N/A            Patient Active Problem List   Diagnosis     HDL deficiency     Elevated PSA     Trigger thumb of left hand     Hyperlipidemia LDL goal <130     Benign prostatic hyperplasia with urinary obstruction     Encounter for counseling     10 year risk of MI or stroke 7.5% or greater, 12.9% in December 2015 on Simvastatin 20     Coronary artery disease involving native coronary artery of native heart without angina pectoris     AMI (acute mesenteric ischemia)     CKD (chronic kidney disease) stage  3, GFR 30-59 ml/min (H)     Wears hearing aid       History reviewed. No pertinent surgical history.    Family History   Problem Relation Age of Onset     Hypertension Mother      Lipids Mother      Dementia Mother      Cancer Father         Hodgkin's age 63       Social History     Socioeconomic History     Marital status:      Spouse name: Not on file     Number of children: Not on file     Years of education: Not on file     Highest education level: Not on file   Occupational History     Not on file   Tobacco Use     Smoking status: Never Smoker     Smokeless tobacco: Never Used   Vaping Use     Vaping Use: Never used   Substance and Sexual Activity     Alcohol use: Yes     Comment: occ     Drug use: No     Sexual activity: Yes     Partners: Female   Other Topics Concern     Parent/sibling w/ CABG, MI or angioplasty before 65F 55M? Not Asked   Social History Narrative     Not on file     Social Determinants of Health     Financial Resource Strain: Not on file   Food Insecurity: Not on file   Transportation Needs: Not on file   Physical Activity: Not on file   Stress: Not on file   Social Connections: Not on file   Intimate Partner Violence: Not on file   Housing Stability: Not on file       Current Outpatient Medications   Medication Sig Dispense Refill     aspirin 81 MG tablet Take 1 tablet by mouth daily.       atorvastatin (LIPITOR) 40 MG tablet Take 40 mg by mouth daily ADDIS.       Calcium Polycarbophil (FIBER) 625 MG tablet Take 1,350 mg by mouth daily       ciclopirox (PENLAC) 8 % external solution APPLY DAILY TO TOE NAILS AS NEEDED       clopidogrel (PLAVIX) 75 MG tablet Take 75 mg by mouth daily       diphenhydrAMINE-acetaminophen (TYLENOL PM)  MG tablet Take by mouth nightly as needed for sleep       lisinopril (PRINIVIL,ZESTRIL) 2.5 MG tablet Take 2.5 mg by mouth daily        loratadine (CLARITIN) 10 MG tablet Take 10 mg by mouth daily       metoprolol (LOPRESSOR) 25 MG tablet Take 0.25 mg  by mouth 2 times daily        Misc Natural Products (FIBER 7 PO) Take 2 tablets by mouth daily.       Multiple Vitamins-Minerals (VITRUM 50+ SENIOR MULTI) TABS Take 1 tablet by mouth daily       nitroGLYcerin (NITROSTAT) 0.4 MG sublingual tablet Place 0.4 mg under the tongue every 5 minutes as needed for chest pain if you are still having symptoms after 3 doses (15 minutes) call 911.       tamsulosin (FLOMAX) 0.4 MG capsule TAKE 1 CAPSULE BY MOUTH EVERY DAY 90 capsule 3     PHYSICAL EXAMINATION:  Blood pressure 120/79, pulse 65, temperature 97.8  F (36.6  C), temperature source Tympanic, resp. rate 16, weight 98 kg (216 lb), SpO2 100 %.  General appearance - healthy, alert and no distress  Skin - Skin color, texture, turgor normal. No rashes or lesions.  Head - Normocephalic. No masses, lesions, tenderness or abnormalities  Eyes - conjunctivae/corneas clear. PERRL, EOM's intact. Fundi benign  Ears - External ears normal. Canals clear. TM's normal.  Nose/Sinuses - Nares normal. Septum midline. Mucosa normal. No drainage or sinus tenderness.  Oropharynx - Lips, mucosa, and tongue normal. Teeth and gums normal.  Neck - Neck supple. No adenopathy. Thyroid symmetric, normal size,  Lungs - Percussion normal. Good diaphragmatic excursion. Lungs clear  Heart - PMI normal. No lifts, heaves, or thrills. RRR. No murmurs, clicks gallops or rub  Abdomen - Abdomen soft, non-tender. BS normal. No masses, organomegaly  Extremities - Extremities normal. No deformities, edema, or skin discoloration.  Musculoskeletal - Spine ROM normal. Muscular strength intact.  Peripheral pulses - radial=4/4, femoral=4/4, popliteal=4/4, dorsalis pedis=4/4,  Neuro - Gait normal. Reflexes normal and symmetric. Sensation grossly WNL.  Genitalia - Penis normal. No urethral discharge. Scrotum normal to palpation. No hernia.psa  Rectal - declined by patient and will deferr to urology         Office Visit on 06/21/2021   Component Date Value Ref Range  Status     Cholesterol 06/21/2021 121  <200 mg/dL Final     Triglycerides 06/21/2021 102  <150 mg/dL Final    Fasting specimen     HDL Cholesterol 06/21/2021 46  >39 mg/dL Final     LDL Cholesterol Calculated 06/21/2021 55  <100 mg/dL Final    Desirable:       <100 mg/dl     Non HDL Cholesterol 06/21/2021 75  <130 mg/dL Final     Sodium 06/21/2021 141  133 - 144 mmol/L Final     Potassium 06/21/2021 4.2  3.4 - 5.3 mmol/L Final     Chloride 06/21/2021 111 (A) 94 - 109 mmol/L Final     Carbon Dioxide 06/21/2021 25  20 - 32 mmol/L Final     Anion Gap 06/21/2021 5  3 - 14 mmol/L Final     Glucose 06/21/2021 88  70 - 99 mg/dL Final    Fasting specimen     Urea Nitrogen 06/21/2021 24  7 - 30 mg/dL Final     Creatinine 06/21/2021 1.30 (A) 0.66 - 1.25 mg/dL Final     GFR Estimate 06/21/2021 54 (A) >60 mL/min/[1.73_m2] Final    Comment: Non  GFR Calc  Starting 12/18/2018, serum creatinine based estimated GFR (eGFR) will be   calculated using the Chronic Kidney Disease Epidemiology Collaboration   (CKD-EPI) equation.       GFR Estimate If Black 06/21/2021 62  >60 mL/min/[1.73_m2] Final    Comment:  GFR Calc  Starting 12/18/2018, serum creatinine based estimated GFR (eGFR) will be   calculated using the Chronic Kidney Disease Epidemiology Collaboration   (CKD-EPI) equation.       Calcium 06/21/2021 8.5  8.5 - 10.1 mg/dL Final     Bilirubin Total 06/21/2021 0.8  0.2 - 1.3 mg/dL Final     Albumin 06/21/2021 3.7  3.4 - 5.0 g/dL Final     Protein Total 06/21/2021 7.5  6.8 - 8.8 g/dL Final     Alkaline Phosphatase 06/21/2021 76  40 - 150 U/L Final     ALT 06/21/2021 38  0 - 70 U/L Final     AST 06/21/2021 26  0 - 45 U/L Final     Hemoglobin 06/21/2021 13.8  13.3 - 17.7 g/dL Final     Creatinine Urine 06/21/2021 112  mg/dL Final     Albumin Urine mg/L 06/21/2021 6  mg/L Final     Albumin Urine mg/g Cr 06/21/2021 5.22  0 - 17 mg/g Cr Final     Parathyroid Hormone Intact 06/21/2021 57  18 - 80 pg/mL  Final     PSA 06/21/2021 11.00 (A) 0 - 4 ug/L Final    Assay Method:  Chemiluminescence using Siemens Vista analyzer       ASSESSMENT:    ICD-10-CM    1. Encounter for Medicare annual wellness exam  Z00.00        Well-Adult Physical Exam.  Health Maintenance Due   Topic Date Due     ANNUAL REVIEW OF HM ORDERS  Never done     URINALYSIS  Never done     BMP  06/21/2022     LIPID  06/21/2022     MICROALBUMIN  06/21/2022     HEMOGLOBIN  06/21/2022     Health Maintenance   Topic Date Due     ANNUAL REVIEW OF HM ORDERS  Never done     URINALYSIS  Never done     BMP  06/21/2022     LIPID  06/21/2022     MICROALBUMIN  06/21/2022     HEMOGLOBIN  06/21/2022     MEDICARE ANNUAL WELLNESS VISIT  05/23/2023     FALL RISK ASSESSMENT  05/23/2023     DTAP/TDAP/TD IMMUNIZATION (3 - Td or Tdap) 10/28/2023     ADVANCE CARE PLANNING  05/23/2027     COLORECTAL CANCER SCREENING  12/05/2029     HEPATITIS C SCREENING  Completed     PHQ-2 (once per calendar year)  Completed     INFLUENZA VACCINE  Completed     Pneumococcal Vaccine: 65+ Years  Completed     ZOSTER IMMUNIZATION  Completed     AORTIC ANEURYSM SCREENING (SYSTEM ASSIGNED)  Completed     COVID-19 Vaccine  Completed     IPV IMMUNIZATION  Aged Out     MENINGITIS IMMUNIZATION  Aged Out     HEPATITIS B IMMUNIZATION  Aged Out         HEALTH CARE MAINTENENCE: The recommended screening tests and vaccinatons for this patient have been discussed as above.  The appropriate tests and vaccinations  have been ordered or declined by the patient. Please see the orders in EPIC.The patient specifically declines: rectal exam    Immunization Status:  up to date and documented    Patient Active Problem List   Diagnosis     HDL deficiency     Elevated PSA     Trigger thumb of left hand     Hyperlipidemia LDL goal <130     Benign prostatic hyperplasia with urinary obstruction     Encounter for counseling     10 year risk of MI or stroke 7.5% or greater, 12.9% in December 2015 on Simvastatin 20      Coronary artery disease involving native coronary artery of native heart without angina pectoris     AMI (acute mesenteric ischemia)     CKD (chronic kidney disease) stage 3, GFR 30-59 ml/min (H)     Wears hearing aid        ATP III Guidelines  ICSI Preventive Guidelines    PLAN:   Check a fasting lipid profile  We discussed possible(lisa) future Colonoscopies and that the last polyp pathology is not available as the sample was lost. I recommended a repeat colonoscopy in 5 years from his last if his health was good at that time and he was agreeable to that  Check a fasting glucose  Check a PSA  Discussed calcium intake, vitamins and supplements. Recommended 1000 mg of calcium daily  Sunscreen use was recommended especially in the area of tatoos  Recommended dental exams every 6 months  Recommended eye exam every 1-2 years  Follow up in 1 year for the next preventative medical visit      The patient reported that he did not need any refills at this time.    Body mass index is 27.73 kg/m .      (R97.20) Elevated PSA  Comment:   Plan: Adult Urology Referral, PSA, screen            (I25.10) Coronary artery disease involving native coronary artery of native heart without angina pectoris  Comment:   Plan: Comprehensive metabolic panel (BMP + Alb, Alk         Phos, ALT, AST, Total. Bili, TP), Lipid panel         reflex to direct LDL Fasting            (N18.30) Stage 3 chronic kidney disease, unspecified whether stage 3a or 3b CKD (H)  Comment:   Plan: Comprehensive metabolic panel (BMP + Alb, Alk         Phos, ALT, AST, Total. Bili, TP), Albumin         Random Urine Quantitative with Creat Ratio,         Hemoglobin, Parathyroid Hormone Intact            (N40.1,  N13.8) Benign prostatic hyperplasia with urinary obstruction  Comment:   Plan: Adult Urology Referral, Comprehensive metabolic        panel (BMP + Alb, Alk Phos, ALT, AST, Total.         Bili, TP)            (M65.312) Trigger thumb of left hand  Comment:   Plan:  Orthopedic  Referral

## 2022-05-23 NOTE — PROGRESS NOTES
"  SUBJECTIVE:   Roman Calero is a 77 year old male who presents for Preventive Visit.    {Split Bill scripting  The purpose of this visit is to discuss your medical history and prevent health problems before you are sick. You may be responsible for a co-pay, coinsurance, or deductible if your visit today includes services such as checking on a sore throat, having an x-ray or lab test, or treating and evaluating a new or existing condition :413672}  {Patient advised of split billing (Optional):491207}  Are you in the first 12 months of your Medicare Part B coverage?  { :731564::\"No\"}    Physical Health:    In general, how would you rate your overall physical health? { :868740}    Outside of work, how many days during the week do you exercise? { :679404}    Outside of work, approximately how many minutes a day do you exercise?{ :129438}    If you drink alcohol do you typically have >3 drinks per day or >7 drinks per week? { :399601}    Do you usually eat at least 4 servings of fruit and vegetables a day, include whole grains & fiber and avoid regularly eating high fat or \"junk\" foods? { :650049::\"Yes\"}    Do you have any problems taking medications regularly?  { :080206::\"No\"}    Do you have any side effects from medications? { :570513}    Needs assistance for the following daily activities: { :510258}    Which of the following safety concerns are present in your home?  { :367952::\"none identified\"}     Hearing impairment: { :565799}    In the past 6 months, have you been bothered by leaking of urine? { :662729}    Mental Health:    In general, how would you rate your overall mental or emotional health? { :002788}  PHQ-2 Score:      Do you feel safe in your environment? { :716896}    Have you ever done Advance Care Planning? (For example, a Health Directive, POLST, or a discussion with a medical provider or your loved ones about your wishes): { :580277}    Additional concerns to address?  {If YES, notify patient " "they may be responsible for a copay, coinsurance or deductible if the visit today includes services such as checking on a sore throat, having an x-ray or lab test, or treating and evaluating a new or existing condition :825540::\"No\"}    Fall risk:  { :156499}  {If any of the above assessments are answered yes, consider ordering appropriate referrals (Optional):883703::\"click delete button to remove this line now\"}  Cognitive Screening: { :101782}    {Do you have sleep apnea, excessive snoring or daytime drowsiness? (Optional):696080}    {Outside tests to abstract? :958482}    {additional problems to add (Optional):739617}    Reviewed and updated as needed this visit by clinical staff                    Reviewed and updated as needed this visit by Provider                   Social History     Tobacco Use     Smoking status: Never Smoker     Smokeless tobacco: Never Used   Substance Use Topics     Alcohol use: Yes     Comment: occ                           Current providers sharing in care for this patient include: {Rooming staff:  Please update Care Team in Rooming Activity, refresh this note and then delete this statement}  Patient Care Team:  Keith Willson MD as PCP - General (Family Practice)  Keith Willson MD as Assigned PCP  Maik Jade MD as Assigned Surgical Provider  Osman Razo MD as Assigned Musculoskeletal Provider    The following health maintenance items are reviewed in Epic and correct as of today:  Health Maintenance   Topic Date Due     ANNUAL REVIEW OF  ORDERS  Never done     URINALYSIS  Never done     PHQ-2 (once per calendar year)  01/01/2022     BMP  06/21/2022     LIPID  06/21/2022     MICROALBUMIN  06/21/2022     FALL RISK ASSESSMENT  06/21/2022     HEMOGLOBIN  06/21/2022     MEDICARE ANNUAL WELLNESS VISIT  05/23/2023     DTAP/TDAP/TD IMMUNIZATION (3 - Td or Tdap) 10/28/2023     ADVANCE CARE PLANNING  06/21/2026     COLORECTAL CANCER SCREENING  12/05/2029     " "HEPATITIS C SCREENING  Completed     INFLUENZA VACCINE  Completed     Pneumococcal Vaccine: 65+ Years  Completed     ZOSTER IMMUNIZATION  Completed     AORTIC ANEURYSM SCREENING (SYSTEM ASSIGNED)  Completed     COVID-19 Vaccine  Completed     IPV IMMUNIZATION  Aged Out     MENINGITIS IMMUNIZATION  Aged Out     HEPATITIS B IMMUNIZATION  Aged Out     {Chronicprobdata (Optional):745091}  {Decision Support (Optional):165220}    ROS:  {ROS COMP:869150}    OBJECTIVE:   There were no vitals taken for this visit. Estimated body mass index is 27.86 kg/m  as calculated from the following:    Height as of 22: 1.88 m (6' 2\").    Weight as of 22: 98.4 kg (217 lb).  EXAM:   {Exam :599260}    {Diagnostic Test Results (Optional):043260::\"Diagnostic Test Results:\",\"Labs reviewed in Epic\"}    ASSESSMENT / PLAN:   {Diag Picklist:467420}    {Patient advised of split billing (Optional):795841}    COUNSELING:  {Medicare Counselin}    Estimated body mass index is 27.86 kg/m  as calculated from the following:    Height as of 22: 1.88 m (6' 2\").    Weight as of 22: 98.4 kg (217 lb).    {Weight Management Plan (ACO) Complete if BMI is abnormal-  Ages 18-64  BMI >24.9.  Age 65+ with BMI <23 or >30 (Optional):133262}    He reports that he has never smoked. He has never used smokeless tobacco.    Appropriate preventive services were discussed with this patient, including applicable screening as appropriate for cardiovascular disease, diabetes, osteopenia/osteoporosis, and glaucoma.  As appropriate for age/gender, discussed screening for colorectal cancer, prostate cancer, breast cancer, and cervical cancer. Checklist reviewing preventive services available has been given to the patient.    Reviewed patients plan of care and provided an AVS. The {CarePlan:028132} for Roman meets the Care Plan requirement. This Care Plan has been established and reviewed with the {PATIENT, FAMILY MEMBER, " CAREGIVER:157874}.    Counseling Resources:  ATP IV Guidelines  Pooled Cohorts Equation Calculator  Breast Cancer Risk Calculator  BRCA-Related Cancer Risk Assessment: FHS-7 Tool  FRAX Risk Assessment  ICSI Preventive Guidelines  Dietary Guidelines for Americans, 2010  USDA's MyPlate  ASA Prophylaxis  Lung CA Screening    Keith Willson MD  [facility]

## 2022-05-24 LAB
CREAT UR-MCNC: 121 MG/DL
MICROALBUMIN UR-MCNC: 11 MG/L
MICROALBUMIN/CREAT UR: 9.09 MG/G CR (ref 0–17)

## 2022-05-24 NOTE — RESULT ENCOUNTER NOTE
Roman,  I have reviewed the results of the laboratory tests that we recently ordered. All of the lab work performed was normal or considered normal for you except your glucose.  The results of your glucose (blood sugar) test was slightly high and in the range of what is called Impaired Glucose Tolerance or Prediabetes. This means that you are at a higher risk to develop type 2 diabetes in the future. Regular aerobic exercise and weight loss are the best ways to prevent yourself from having full blown diabetes. We should recheck this lab test every year at your physical.    Please follow up with Dr Jade regarding the PSA.    Keith Willson MD

## 2022-05-31 NOTE — PROGRESS NOTES
SUBJECTIVE:   Roman Calero is a 77 year old male who is seen in follow-up for evaluation of his left index trigger finger.    He does not have pain, and the inability to flex the finger fully is his only problem.  He is not bothered by this, and is here because his wife told him to come in.    Treatments tried to this point: Corticosteroid injection on 9/2/21.      Past Medical History: No past medical history on file.  Past Surgical History: No past surgical history on file.  Family History:   Family History   Problem Relation Age of Onset     Hypertension Mother      Lipids Mother      Dementia Mother      Cancer Father         Hodgkin's age 63     Social History:   Social History     Tobacco Use     Smoking status: Never Smoker     Smokeless tobacco: Never Used   Substance Use Topics     Alcohol use: Yes     Comment: occ       Review of Systems:  Constitutional:  NEGATIVE for fever, chills, change in weight  Integumentary/Skin:  NEGATIVE for worrisome rashes, moles or lesions  Eyes:  NEGATIVE for vision changes or irritation  ENT/Mouth:  NEGATIVE for ear, mouth and throat problems  Resp:  NEGATIVE for significant cough or SOB  Breast:  NEGATIVE for masses, tenderness or discharge  CV:  NEGATIVE for chest pain, palpitations or peripheral edema  GI:  NEGATIVE for nausea, abdominal pain, heartburn, or change in bowel habits  :  Negative   Musculoskeletal:  See HPI above  Neuro:  NEGATIVE for weakness, dizziness or paresthesias  Endocrine:  NEGATIVE for temperature intolerance, skin/hair changes  Heme/allergy/immune:  NEGATIVE for bleeding problems  Psychiatric:  NEGATIVE for changes in mood or affect      OBJECTIVE:  Physical Exam:  /74 (BP Location: Left arm, Patient Position: Sitting, Cuff Size: Adult Regular)   Pulse 72   SpO2 100%   General Appearance: healthy, alert and no distress   Skin: no suspicious lesions or rashes  Neuro: Normal strength and tone, mentation intact and speech normal  Vascular:  good pulses, and cappillary refill   Lymph: no lymphadenopathy   Psych:  mentation appears normal and affect normal/bright  Resp: no increased work of breathing     Left Hand Exam:  Has difficulty fully flexing the index finger.   Non-tender      ASSESSMENT:   History of left index trigger finger. No pain    PLAN:   He wanted no intervention    Return to clinic: as needed     ABI Razo MD  Dept. Orthopedic Surgery  [facility]

## 2022-06-02 ENCOUNTER — OFFICE VISIT (OUTPATIENT)
Dept: ORTHOPEDICS | Facility: CLINIC | Age: 77
End: 2022-06-02
Payer: COMMERCIAL

## 2022-06-02 VITALS — DIASTOLIC BLOOD PRESSURE: 74 MMHG | OXYGEN SATURATION: 100 % | HEART RATE: 72 BPM | SYSTOLIC BLOOD PRESSURE: 130 MMHG

## 2022-06-02 DIAGNOSIS — M65.312 TRIGGER THUMB OF LEFT HAND: ICD-10-CM

## 2022-06-02 PROCEDURE — 99213 OFFICE O/P EST LOW 20 MIN: CPT | Performed by: ORTHOPAEDIC SURGERY

## 2022-06-02 ASSESSMENT — PAIN SCALES - GENERAL: PAINLEVEL: MILD PAIN (2)

## 2022-06-02 NOTE — LETTER
6/2/2022         RE: Roman Calero  [address] [address] [place] [address]        Dear Colleague,    Thank you for referring your patient, Roman Calero, to the [facility]. Please see a copy of my visit note below.    SUBJECTIVE:   Roman Calero is a 77 year old male who is seen in follow-up for evaluation of his left index trigger finger.    He does not have pain, and the inability to flex the finger fully is his only problem.  He is not bothered by this, and is here because his wife told him to come in.    Treatments tried to this point: Corticosteroid injection on 9/2/21.      Past Medical History: No past medical history on file.  Past Surgical History: No past surgical history on file.  Family History:   Family History   Problem Relation Age of Onset     Hypertension Mother      Lipids Mother      Dementia Mother      Cancer Father         Hodgkin's age 63     Social History:   Social History     Tobacco Use     Smoking status: Never Smoker     Smokeless tobacco: Never Used   Substance Use Topics     Alcohol use: Yes     Comment: occ       Review of Systems:  Constitutional:  NEGATIVE for fever, chills, change in weight  Integumentary/Skin:  NEGATIVE for worrisome rashes, moles or lesions  Eyes:  NEGATIVE for vision changes or irritation  ENT/Mouth:  NEGATIVE for ear, mouth and throat problems  Resp:  NEGATIVE for significant cough or SOB  Breast:  NEGATIVE for masses, tenderness or discharge  CV:  NEGATIVE for chest pain, palpitations or peripheral edema  GI:  NEGATIVE for nausea, abdominal pain, heartburn, or change in bowel habits  :  Negative   Musculoskeletal:  See HPI above  Neuro:  NEGATIVE for weakness, dizziness or paresthesias  Endocrine:  NEGATIVE for temperature intolerance, skin/hair changes  Heme/allergy/immune:  NEGATIVE for bleeding problems  Psychiatric:  NEGATIVE for changes in mood or affect      OBJECTIVE:  Physical Exam:  /74 (BP Location: Left  arm, Patient Position: Sitting, Cuff Size: Adult Regular)   Pulse 72   SpO2 100%   General Appearance: healthy, alert and no distress   Skin: no suspicious lesions or rashes  Neuro: Normal strength and tone, mentation intact and speech normal  Vascular: good pulses, and cappillary refill   Lymph: no lymphadenopathy   Psych:  mentation appears normal and affect normal/bright  Resp: no increased work of breathing     Left Hand Exam:  Has difficulty fully flexing the index finger.   Non-tender      ASSESSMENT:   History of left index trigger finger. No pain    PLAN:   He wanted no intervention    Return to clinic: as needed     ABI Razo MD  Dept. Orthopedic Surgery  [facility]       Again, thank you for allowing me to participate in the care of your patient.        Sincerely,        Osman Razo MD

## 2022-06-10 ENCOUNTER — TELEPHONE (OUTPATIENT)
Dept: FAMILY MEDICINE | Facility: CLINIC | Age: 77
End: 2022-06-10
Payer: COMMERCIAL

## 2022-06-10 NOTE — TELEPHONE ENCOUNTER
There is a consent to communicate with Kaci Calero (wife on file dated 5/22/2017    Provider:  Please see message below.  Please advise.  Thank you. Mariluz Clemons is calling due to the diagnosis HDL deficiency and CKD stage 3 that appear on his AVS from 5/23/2022.  Kaci reports that neither one of them were aware that he had either one of these issues.  They would like to know what this means and when did this happen.

## 2022-08-16 DIAGNOSIS — N13.8 BENIGN PROSTATIC HYPERPLASIA WITH URINARY OBSTRUCTION: ICD-10-CM

## 2022-08-16 DIAGNOSIS — N40.1 BENIGN PROSTATIC HYPERPLASIA WITH URINARY OBSTRUCTION: ICD-10-CM

## 2022-08-16 RX ORDER — TAMSULOSIN HYDROCHLORIDE 0.4 MG/1
CAPSULE ORAL
Qty: 90 CAPSULE | Refills: 0 | Status: SHIPPED | OUTPATIENT
Start: 2022-08-16 | End: 2022-09-02

## 2022-08-16 NOTE — TELEPHONE ENCOUNTER
ONE TIME HARDEEP Refill. Flomax 90 with 0 refills. Last OV= 6/29/21. Next OV is 9/2/22    Melanie STEPHEN RN Urology 8/16/2022 3:44 PM

## 2022-09-02 ENCOUNTER — OFFICE VISIT (OUTPATIENT)
Dept: UROLOGY | Facility: CLINIC | Age: 77
End: 2022-09-02
Payer: COMMERCIAL

## 2022-09-02 VITALS — HEART RATE: 82 BPM | DIASTOLIC BLOOD PRESSURE: 86 MMHG | SYSTOLIC BLOOD PRESSURE: 130 MMHG | OXYGEN SATURATION: 100 %

## 2022-09-02 DIAGNOSIS — N40.1 BENIGN PROSTATIC HYPERPLASIA WITH URINARY OBSTRUCTION: Primary | ICD-10-CM

## 2022-09-02 DIAGNOSIS — R97.20 ELEVATED PSA: ICD-10-CM

## 2022-09-02 DIAGNOSIS — N13.8 BENIGN PROSTATIC HYPERPLASIA WITH URINARY OBSTRUCTION: Primary | ICD-10-CM

## 2022-09-02 PROCEDURE — 51798 US URINE CAPACITY MEASURE: CPT | Performed by: UROLOGY

## 2022-09-02 PROCEDURE — 99213 OFFICE O/P EST LOW 20 MIN: CPT | Mod: 25 | Performed by: UROLOGY

## 2022-09-02 RX ORDER — FINASTERIDE 5 MG/1
5 TABLET, FILM COATED ORAL DAILY
Qty: 90 TABLET | Refills: 3 | Status: SHIPPED | OUTPATIENT
Start: 2022-09-02 | End: 2022-12-06

## 2022-09-02 RX ORDER — TAMSULOSIN HYDROCHLORIDE 0.4 MG/1
0.4 CAPSULE ORAL EVERY EVENING
Qty: 90 CAPSULE | Refills: 3 | Status: SHIPPED | OUTPATIENT
Start: 2022-09-02 | End: 2023-10-24

## 2022-09-02 NOTE — PROGRESS NOTES
Bladder Scan performed. 162ml maximum residual urine detected after 3 scans. MD informed.    Melanie STEPHEN RN Urology 9/2/2022 11:49 AM

## 2022-09-02 NOTE — PROGRESS NOTES
Chief Complaint   Patient presents with     RECHECK       Roman Calero is a 77 year old male who presents today for follow up of   Chief Complaint   Patient presents with     RECHECK    f/u for benign prostatic hyperplasia/elevated psa.  His LUTS are better since flomax.  He still gets up 3x per day.  Recent psa was stable.    Current Outpatient Medications   Medication Sig Dispense Refill     aspirin 81 MG tablet Take 1 tablet by mouth daily.       atorvastatin (LIPITOR) 40 MG tablet Take 40 mg by mouth daily ADDIS.       Calcium Polycarbophil (FIBER) 625 MG tablet Take 1,350 mg by mouth daily       ciclopirox (PENLAC) 8 % external solution APPLY DAILY TO TOE NAILS AS NEEDED       clopidogrel (PLAVIX) 75 MG tablet Take 75 mg by mouth daily       diphenhydrAMINE-acetaminophen (TYLENOL PM)  MG tablet Take by mouth nightly as needed for sleep       lisinopril (PRINIVIL,ZESTRIL) 2.5 MG tablet Take 2.5 mg by mouth daily        loratadine (CLARITIN) 10 MG tablet Take 10 mg by mouth daily       metoprolol (LOPRESSOR) 25 MG tablet Take 0.25 mg by mouth 2 times daily        Misc Natural Products (FIBER 7 PO) Take 2 tablets by mouth daily.       Multiple Vitamins-Minerals (VITRUM 50+ SENIOR MULTI) TABS Take 1 tablet by mouth daily       nitroGLYcerin (NITROSTAT) 0.4 MG sublingual tablet Place 0.4 mg under the tongue every 5 minutes as needed for chest pain if you are still having symptoms after 3 doses (15 minutes) call 911.       tamsulosin (FLOMAX) 0.4 MG capsule TAKE 1 CAPSULE BY MOUTH EVERY DAY. Keep your appointment to ensure future fills 90 capsule 0     No Known Allergies   No past medical history on file.  No past surgical history on file.  Family History   Problem Relation Age of Onset     Hypertension Mother      Lipids Mother      Dementia Mother      Cancer Father         Hodgkin's age 63     Social History     Socioeconomic History     Marital status:      Spouse name: None     Number of children:  None     Years of education: None     Highest education level: None   Tobacco Use     Smoking status: Never Smoker     Smokeless tobacco: Never Used   Vaping Use     Vaping Use: Never used   Substance and Sexual Activity     Alcohol use: Yes     Comment: occ     Drug use: No     Sexual activity: Yes     Partners: Female       REVIEW OF SYSTEMS  =================  C: NEGATIVE for fever, chills, change in weight  I: NEGATIVE for worrisome rashes, moles or lesions  E/M: NEGATIVE for ear, mouth and throat problems  R: NEGATIVE for significant cough or SHORTNESS OF BREATH  CV:  NEGATIVE for chest pain, palpitations or peripheral edema  GI: NEGATIVE for nausea, abdominal pain, heartburn, or change in bowel habits  NEURO: NEGATIVE numbness/weakness  : see HPI  PSYCH: NEGATIVE depression/anxiety  LYmph: no new enlarged lymph nodes  Ortho: no new trauma/movements    Physical Exam:  Blood pressure 130/86, pulse 82, SpO2 100 %.    GENERAL: healthy, alert and no distress  EYES: Eyes grossly normal to inspection, conjunctivae and sclerae normal  RESP: no audible wheeze, cough, or visible cyanosis.  No visible retractions or increased work of breathing.  Able to speak fully in complete sentences.  NEURO: Cranial nerves grossly intact, mentation intact and speech normal  PSYCH: mentation appears normal, affect normal/bright, judgement and insight intact, normal speech and appearance well-groomed    Assessment/Plan:   (N40.1,  N13.8) Benign prostatic hyperplasia with urinary obstruction  (primary encounter diagnosis)  Comment:    Plan: MEASURE POST-VOID RESIDUAL URINE/BLADDER         CAPACITY, US NON-IMAGING (35852)         High PVR         Cont with flomax         Add proscar         Recheck in one year    (R97.20) Elevated PSA  Comment:    Plan:  Per primary

## 2022-10-04 ENCOUNTER — TELEPHONE (OUTPATIENT)
Dept: UROLOGY | Facility: CLINIC | Age: 77
End: 2022-10-04

## 2022-10-05 NOTE — TELEPHONE ENCOUNTER
Writer called and spoke with patient's wife Kaci. They would not like to have the medication restarted and opted for a cystoscopy.  Appointment scheduled for November.    Melanie STEPHEN RN Urology 10/5/2022 4:33 PM    
[facility] Call Center    Phone Message    May a detailed message be left on voicemail: yes     Reason for Call: Pt wife called stating Pt is having side effects from medication:  finasteride (PROSCAR) 5 MG tablet  Pt is urinating often. Please call pt to set up new medication or discuss. Thank you    Action Taken: Message routed to:  Other: Uro    Travel Screening: Not Applicable                                                                      
Writer called back and spoke with patient's wife.  She states that the patient was not emptying completely secondary to an enlarged prostate and at that appointment Dr. Jade had added on finasteride in addition to Flomax.  A few days after starting Finasteride, The patient experienced diarrhea for 2 weeks and nocturia up to 5 times every night.  Patient stopped the medication 2 weeks ago and since his side effects have improved.     Pt continues to take Flomax.    Wife is questioning what to do next, or if there is a combination drug he should be taking?    Routing to MD.    Melanie STEPHEN RN Urology 10/5/2022 10:11 AM    
breastfeeding exclusively

## 2022-10-06 ENCOUNTER — TELEPHONE (OUTPATIENT)
Dept: UROLOGY | Facility: CLINIC | Age: 77
End: 2022-10-06

## 2022-10-06 NOTE — TELEPHONE ENCOUNTER
[facility] Call Center    Phone Message    May a detailed message be left on voicemail: yes     Reason for Call: Other: Patients spouse called and spoke with writer, she needs to reschedule cysto. Please call Kaci to reschedule      Action Taken: Message routed to:  [facility] Urology     Travel Screening: Not Applicable

## 2022-12-06 ENCOUNTER — OFFICE VISIT (OUTPATIENT)
Dept: UROLOGY | Facility: CLINIC | Age: 77
End: 2022-12-06
Payer: COMMERCIAL

## 2022-12-06 VITALS — SYSTOLIC BLOOD PRESSURE: 136 MMHG | DIASTOLIC BLOOD PRESSURE: 80 MMHG | OXYGEN SATURATION: 98 % | HEART RATE: 71 BPM

## 2022-12-06 DIAGNOSIS — N40.1 BENIGN PROSTATIC HYPERPLASIA WITH URINARY OBSTRUCTION: Primary | ICD-10-CM

## 2022-12-06 DIAGNOSIS — N13.8 BENIGN PROSTATIC HYPERPLASIA WITH URINARY OBSTRUCTION: Primary | ICD-10-CM

## 2022-12-06 PROCEDURE — 51798 US URINE CAPACITY MEASURE: CPT | Performed by: UROLOGY

## 2022-12-06 PROCEDURE — 99213 OFFICE O/P EST LOW 20 MIN: CPT | Mod: 25 | Performed by: UROLOGY

## 2022-12-06 NOTE — PROGRESS NOTES
Chief Complaint   Patient presents with     Procedure     RECHECK       Roman Calero is a 77 year old male who presents today for follow up of   Chief Complaint   Patient presents with     Procedure     RECHECK   77-year-old man with history of BPH and lower urinary tract symptoms.  He was on Flomax and placed on Proscar recently.  He took it for 2 weeks and had some diarrhea and also some irritative voiding symptoms afterwards.  He has since stopped the medication.  He is on Flomax for now.    Current Outpatient Medications   Medication Sig Dispense Refill     aspirin 81 MG tablet Take 1 tablet by mouth daily.       atorvastatin (LIPITOR) 40 MG tablet Take 40 mg by mouth daily ADDIS.       Calcium Polycarbophil (FIBER) 625 MG tablet Take 1,350 mg by mouth daily       loratadine (CLARITIN) 10 MG tablet Take 10 mg by mouth daily       metoprolol (LOPRESSOR) 25 MG tablet Take 0.25 mg by mouth 2 times daily        Misc Natural Products (FIBER 7 PO) Take 2 tablets by mouth daily.       Multiple Vitamins-Minerals (VITRUM 50+ SENIOR MULTI) TABS Take 1 tablet by mouth daily       nitroGLYcerin (NITROSTAT) 0.4 MG sublingual tablet Place 0.4 mg under the tongue every 5 minutes as needed for chest pain if you are still having symptoms after 3 doses (15 minutes) call 911.       tamsulosin (FLOMAX) 0.4 MG capsule Take 1 capsule (0.4 mg) by mouth every evening TAKE 1 CAPSULE BY MOUTH EVERY DAY. Keep your appointment to ensure future fills 90 capsule 3     No Known Allergies   No past medical history on file.  No past surgical history on file.  Family History   Problem Relation Age of Onset     Hypertension Mother      Lipids Mother      Dementia Mother      Cancer Father         Hodgkin's age 63     Social History     Socioeconomic History     Marital status:      Spouse name: None     Number of children: None     Years of education: None     Highest education level: None   Tobacco Use     Smoking status: Never      Smokeless tobacco: Never   Vaping Use     Vaping Use: Never used   Substance and Sexual Activity     Alcohol use: Yes     Comment: occ     Drug use: No     Sexual activity: Yes     Partners: Female       REVIEW OF SYSTEMS  =================  C: NEGATIVE for fever, chills, change in weight  I: NEGATIVE for worrisome rashes, moles or lesions  E/M: NEGATIVE for ear, mouth and throat problems  R: NEGATIVE for significant cough or SHORTNESS OF BREATH  CV:  NEGATIVE for chest pain, palpitations or peripheral edema  GI: NEGATIVE for nausea, abdominal pain, heartburn, or change in bowel habits  NEURO: NEGATIVE numbness/weakness  : see HPI  PSYCH: NEGATIVE depression/anxiety  LYmph: no new enlarged lymph nodes  Ortho: no new trauma/movements    Physical Exam:  Blood pressure 136/80, pulse 71, SpO2 98 %.    GENERAL: healthy, alert and no distress  EYES: Eyes grossly normal to inspection, conjunctivae and sclerae normal  RESP: no audible wheeze, cough, or visible cyanosis.  No visible retractions or increased work of breathing.  Able to speak fully in complete sentences.  NEURO: Cranial nerves grossly intact, mentation intact and speech normal  PSYCH: mentation appears normal, affect normal/bright, judgement and insight intact, normal speech and appearance well-groomed    Assessment/Plan:   (N40.1,  N13.8) Benign prostatic hyperplasia with urinary obstruction  (primary encounter diagnosis)  Comment: Bladder scan with minimal residual urine.  Discussed it is unlikely that Proscar was the reason for his recent symptoms.  He can retake it again if he wants to.  Plan: f/u annually

## 2023-04-23 ENCOUNTER — HEALTH MAINTENANCE LETTER (OUTPATIENT)
Age: 78
End: 2023-04-23

## 2023-06-07 ENCOUNTER — OFFICE VISIT (OUTPATIENT)
Dept: FAMILY MEDICINE | Facility: CLINIC | Age: 78
End: 2023-06-07
Payer: COMMERCIAL

## 2023-06-07 VITALS
SYSTOLIC BLOOD PRESSURE: 113 MMHG | BODY MASS INDEX: 27.08 KG/M2 | DIASTOLIC BLOOD PRESSURE: 68 MMHG | OXYGEN SATURATION: 96 % | HEIGHT: 74 IN | WEIGHT: 211 LBS | HEART RATE: 78 BPM

## 2023-06-07 DIAGNOSIS — H61.23 BILATERAL IMPACTED CERUMEN: Primary | ICD-10-CM

## 2023-06-07 PROCEDURE — 69209 REMOVE IMPACTED EAR WAX UNI: CPT | Mod: 50 | Performed by: NURSE PRACTITIONER

## 2023-06-07 NOTE — PROGRESS NOTES
"  Assessment & Plan     Bilateral impacted cerumen  - Unable to visualize TMs on exam today due to increased cerumen. Ear wash performed in clinic today with good results. Patient able to hear better after procedure.      BMI:   Estimated body mass index is 27.09 kg/m  as calculated from the following:    Height as of this encounter: 1.88 m (6' 2\").    Weight as of this encounter: 95.7 kg (211 lb).       Candy Puentes RN, DNP Student    [facility]  Provider Disclosure:  I agree with above History, Review of Systems, Physical exam and Plan. I have reviewed the content of the documentation and have edited it as needed. I have personally performed the services documented here and the documentation accurately represents those services and the decisions I have made.            Angel Owens is a 78 year old, presenting for the following health issues:  Ear Fullness  Roman was see earlier this week for new hearing aids. They were unable to  fit his new hearing aids due to having too much ear wax. They asked that he make an appointment with his primary care clinic to have his ears cleaned out. They gave him O Dinell peroxide solution to use once a day to help loosen the wax. Roman has been feeling a fullness in his left ear and his hearing is significantly diminished. Denies pain or drainage.         6/7/2023    11:13 AM   Additional Questions   Roomed by lindsey   Accompanied by self     History of Present Illness       Reason for visit:  Ear compaction/ flush  Symptoms include:  Can't  hear or use hearing aids  Symptom intensity:  Moderate  Symptom progression:  Improving  Had these symptoms before:  Yes  Has tried/received treatment for these symptoms:  Yes  Previous treatment was successful:  Yes  Prior treatment description:  Ear flush  What makes it worse:  No  What makes it better:  Yes, it opens a little    He eats 0-1 servings of fruits and vegetables " "daily.He consumes 0 sweetened beverage(s) daily.He exercises with enough effort to increase his heart rate 10 to 19 minutes per day.  He exercises with enough effort to increase his heart rate 4 days per week.   He is taking medications regularly.       Review of Systems   Constitutional, HEENT, cardiovascular, pulmonary, gi and gu systems are negative, except as otherwise noted.      Objective    /68   Pulse 78   Ht 1.88 m (6' 2\")   Wt 95.7 kg (211 lb)   SpO2 96%   BMI 27.09 kg/m    Body mass index is 27.09 kg/m .  Physical Exam   GENERAL: healthy, alert and no distress  HENT: normal cephalic/atraumatic, right ear: occluded with wax and left ear: excessive cerumen unable to fully visualize TM. After ear wash normal ear canals and TMs normal.   MS: no gross musculoskeletal defects noted, no edema                    "

## 2023-06-19 ASSESSMENT — ENCOUNTER SYMPTOMS
COUGH: 0
JOINT SWELLING: 0
HEARTBURN: 0
SHORTNESS OF BREATH: 0
CHILLS: 0
ABDOMINAL PAIN: 0
HEMATURIA: 0
DIZZINESS: 0
PARESTHESIAS: 0
FEVER: 0
HEMATOCHEZIA: 0
NERVOUS/ANXIOUS: 0
HEADACHES: 0
CONSTIPATION: 0
EYE PAIN: 0
FREQUENCY: 1
ARTHRALGIAS: 1
WEAKNESS: 0
MYALGIAS: 0
DYSURIA: 0
NAUSEA: 0
DIARRHEA: 0
PALPITATIONS: 0
SORE THROAT: 0

## 2023-06-19 ASSESSMENT — ACTIVITIES OF DAILY LIVING (ADL): CURRENT_FUNCTION: NO ASSISTANCE NEEDED

## 2023-06-20 ENCOUNTER — OFFICE VISIT (OUTPATIENT)
Dept: FAMILY MEDICINE | Facility: CLINIC | Age: 78
End: 2023-06-20
Payer: COMMERCIAL

## 2023-06-20 VITALS
SYSTOLIC BLOOD PRESSURE: 120 MMHG | BODY MASS INDEX: 27.73 KG/M2 | HEIGHT: 73 IN | DIASTOLIC BLOOD PRESSURE: 72 MMHG | HEART RATE: 64 BPM | TEMPERATURE: 98.5 F | WEIGHT: 209.2 LBS | OXYGEN SATURATION: 98 % | RESPIRATION RATE: 16 BRPM

## 2023-06-20 DIAGNOSIS — N13.8 BENIGN PROSTATIC HYPERPLASIA WITH URINARY OBSTRUCTION: ICD-10-CM

## 2023-06-20 DIAGNOSIS — N18.30 STAGE 3 CHRONIC KIDNEY DISEASE, UNSPECIFIED WHETHER STAGE 3A OR 3B CKD (H): ICD-10-CM

## 2023-06-20 DIAGNOSIS — E78.5 HYPERLIPIDEMIA LDL GOAL <130: ICD-10-CM

## 2023-06-20 DIAGNOSIS — N40.1 BENIGN PROSTATIC HYPERPLASIA WITH URINARY OBSTRUCTION: ICD-10-CM

## 2023-06-20 DIAGNOSIS — Z00.00 ENCOUNTER FOR SUBSEQUENT ANNUAL WELLNESS VISIT (AWV) IN MEDICARE PATIENT: Primary | ICD-10-CM

## 2023-06-20 LAB — HGB BLD-MCNC: 15.3 G/DL (ref 13.3–17.7)

## 2023-06-20 PROCEDURE — G0439 PPPS, SUBSEQ VISIT: HCPCS | Performed by: FAMILY MEDICINE

## 2023-06-20 PROCEDURE — 80048 BASIC METABOLIC PNL TOTAL CA: CPT | Performed by: FAMILY MEDICINE

## 2023-06-20 PROCEDURE — 36415 COLL VENOUS BLD VENIPUNCTURE: CPT | Performed by: FAMILY MEDICINE

## 2023-06-20 PROCEDURE — 85018 HEMOGLOBIN: CPT | Performed by: FAMILY MEDICINE

## 2023-06-20 PROCEDURE — 80061 LIPID PANEL: CPT | Performed by: FAMILY MEDICINE

## 2023-06-20 PROCEDURE — 99214 OFFICE O/P EST MOD 30 MIN: CPT | Mod: 25 | Performed by: FAMILY MEDICINE

## 2023-06-20 RX ORDER — CLOPIDOGREL BISULFATE 75 MG/1
75 TABLET ORAL DAILY
COMMUNITY
End: 2024-05-31

## 2023-06-20 RX ORDER — LISINOPRIL 2.5 MG/1
2.5 TABLET ORAL DAILY
COMMUNITY
End: 2024-05-31

## 2023-06-20 ASSESSMENT — ENCOUNTER SYMPTOMS
HEADACHES: 0
DIARRHEA: 0
ARTHRALGIAS: 1
NAUSEA: 0
DYSURIA: 0
JOINT SWELLING: 0
HEARTBURN: 0
PALPITATIONS: 0
CONSTIPATION: 0
SHORTNESS OF BREATH: 0
PARESTHESIAS: 0
HEMATURIA: 0
ABDOMINAL PAIN: 0
FEVER: 0
EYE PAIN: 0
NERVOUS/ANXIOUS: 0
HEMATOCHEZIA: 0
FREQUENCY: 1
SORE THROAT: 0
WEAKNESS: 0
CHILLS: 0
COUGH: 0
MYALGIAS: 0
DIZZINESS: 0

## 2023-06-20 ASSESSMENT — PAIN SCALES - GENERAL: PAINLEVEL: NO PAIN (0)

## 2023-06-20 ASSESSMENT — ACTIVITIES OF DAILY LIVING (ADL): CURRENT_FUNCTION: NO ASSISTANCE NEEDED

## 2023-06-20 NOTE — PROGRESS NOTES
"SUBJECTIVE:   Roman is a 78 year old who presents for Preventive Visit.  History cad, htn, ALLERGIC RHINITIS, CRI, high cholesterol and BPH (seen urology). .   Seeing metrocards. No nitrog needed. Good exercise tolerance.   No nausea, vomiting or diarrhea or black/bloody stools. No urine changes or hematuria.   , 2 children.  No grand children.  Kids in [place] - helpful.  Wife doing ok.   No falls. Memory ok.   Sleep overall ok with melatonin. No major snoring.  Emotionally doing ok.   Balanced diet. VitaminD/milk. [place] in winter.   No rashes/mole changes. No testicle pain/masses..  Dentist x2/year.  Eye last week.   No outside blood pressure reading. Walking a lot.         6/20/2023    11:50 AM   Additional Questions   Roomed by DONNA Elizabeth CMA     Are you in the first 12 months of your Medicare coverage?  No    Healthy Habits:     In general, how would you rate your overall health?  Good    Frequency of exercise:  2-3 days/week    Duration of exercise:  15-30 minutes    Do you usually eat at least 4 servings of fruit and vegetables a day, include whole grains    & fiber and avoid regularly eating high fat or \"junk\" foods?  No    Taking medications regularly:  Yes    Medication side effects:  None    Ability to successfully perform activities of daily living:  No assistance needed    Home Safety:  Throw rugs in the hallway    Hearing Impairment:  Difficulty following a conversation in a noisy restaurant or crowded room, feel that people are mumbling or not speaking clearly, need to ask people to speak up or repeat themselves, find that men's voices are easier to understand than woman's and difficulty understanding soft or whispered speech    In the past 6 months, have you been bothered by leaking of urine?  No    In general, how would you rate your overall mental or emotional health?  Good      PHQ-2 Total Score: 0    Additional concerns today:  Yes        Have you ever done Advance Care Planning? (For example, a " Health Directive, POLST, or a discussion with a medical provider or your loved ones about your wishes): Yes, advance care planning is on file.    Has hearing aides   Fall risk  Fallen 2 or more times in the past year?: No  Any fall with injury in the past year?: No    Cognitive Screening   1) Repeat 3 items (Leader, Season, Table)    2) Clock draw: NORMAL  3) 3 item recall: Recalls 2 objects   Results: NORMAL clock, 1-2 items recalled: COGNITIVE IMPAIRMENT LESS LIKELY    Mini-CogTM Copyright S Abelino. Licensed by the author for use in [facility]; reprinted with permission (eben@[facility]). All rights reserved.      Do you have sleep apnea, excessive snoring or daytime drowsiness?: occasional nap    Reviewed and updated as needed this visit by clinical staff    Allergies  Meds              Reviewed and updated as needed this visit by Provider                 Social History     Tobacco Use     Smoking status: Never     Smokeless tobacco: Never   Vaping Use     Vaping status: Never Used   Substance Use Topics     Alcohol use: Not Currently     Comment: occ             6/19/2023    11:33 AM   Alcohol Use   Prescreen: >3 drinks/day or >7 drinks/week? No     Do you have a current opioid prescription? No  Do you use any other controlled substances or medications that are not prescribed by a provider? None          PROBLEMS TO ADD ON...    Current providers sharing in care for this patient include:   Patient Care Team:  Osman Man MD as PCP - General  Jade, Maik Vines MD as Assigned Surgical Provider  Osman Razo MD as Assigned Musculoskeletal Provider  Alissa Ellsworth MD as Assigned PCP  Keith Man MD as MD (Family Medicine)    The following health maintenance items are reviewed in Epic and correct as of today:  Health Maintenance   Topic Date Due     ANNUAL REVIEW OF HM ORDERS  Never done     URINALYSIS  Never done     COVID-19 Vaccine (5 - Pfizer series) 06/21/2022     BMP   "05/23/2023     LIPID  05/23/2023     MICROALBUMIN  05/23/2023     MEDICARE ANNUAL WELLNESS VISIT  05/23/2023     HEMOGLOBIN  05/23/2023     INFLUENZA VACCINE (Season Ended) 09/01/2023     DTAP/TDAP/TD IMMUNIZATION (2 - Td or Tdap) 10/28/2023     FALL RISK ASSESSMENT  06/20/2024     ADVANCE CARE PLANNING  05/23/2027     HEPATITIS C SCREENING  Completed     PHQ-2 (once per calendar year)  Completed     Pneumococcal Vaccine: 65+ Years  Completed     ZOSTER IMMUNIZATION  Completed     IPV IMMUNIZATION  Aged Out     MENINGITIS IMMUNIZATION  Aged Out     COLORECTAL CANCER SCREENING  Discontinued     Lab work is in process      Review of Systems   Constitutional: Negative for chills and fever.   HENT: Positive for hearing loss. Negative for congestion, ear pain and sore throat.    Eyes: Negative for pain and visual disturbance.   Respiratory: Negative for cough and shortness of breath.    Cardiovascular: Negative for chest pain, palpitations and peripheral edema.   Gastrointestinal: Negative for abdominal pain, constipation, diarrhea, heartburn, hematochezia and nausea.   Genitourinary: Positive for frequency and urgency. Negative for dysuria, genital sores, hematuria, impotence and penile discharge.   Musculoskeletal: Positive for arthralgias. Negative for joint swelling and myalgias.   Skin: Negative for rash.   Neurological: Negative for dizziness, weakness, headaches and paresthesias.   Psychiatric/Behavioral: Negative for mood changes. The patient is not nervous/anxious.          OBJECTIVE:   /72   Pulse 64   Temp 98.5  F (36.9  C) (Oral)   Resp 16   Ht 1.854 m (6' 1\")   Wt 94.9 kg (209 lb 3.2 oz)   SpO2 98%   BMI 27.60 kg/m   Estimated body mass index is 27.6 kg/m  as calculated from the following:    Height as of this encounter: 1.854 m (6' 1\").    Weight as of this encounter: 94.9 kg (209 lb 3.2 oz).  Physical Exam  GENERAL: healthy, alert and no distress  EYES: Eyes grossly normal to inspection, PERRL " and conjunctivae and sclerae normal  HENT: ear canals and TM's normal, nose and mouth without ulcers or lesions  NECK: no adenopathy, no asymmetry, masses, or scars and thyroid normal to palpation  RESP: lungs clear to auscultation - no rales, rhonchi or wheezes  BREAST: normal without masses, tenderness or nipple discharge and no palpable axillary masses or adenopathy  CV: regular rate and rhythm, normal S1 S2, no S3 or S4, no murmur, click or rub, no peripheral edema and peripheral pulses strong  ABDOMEN: soft, nontender, no hepatosplenomegaly, no masses and bowel sounds normal   (male): patient deferred /rectal exams  MS: no gross musculoskeletal defects noted, no edema  SKIN: no suspicious lesions or rashes  NEURO: Normal strength and tone, mentation intact and speech normal  PSYCH: mentation appears normal, affect normal/bright    ASSESSMENT / PLAN:   ASSESSMENT / PLAN:  (Z00.00) Encounter for subsequent annual wellness visit (AWV) in Medicare patient  (primary encounter diagnosis)  Comment: generally healthy and normal exam  Plan: Reviewed self mole/testicle check handout.  Exercise- add more weight lifting.     (E78.5) Hyperlipidemia LDL goal <130  Comment: stable in npas  Plan: Lipid panel reflex to direct LDL Non-fasting        Continue meds. Exercise  Follow-up per cardiology. Chest pain or shortness of breath to er.     (N18.30) Stage 3 chronic kidney disease, unspecified whether stage 3a or 3b CKD (H)    Plan: BASIC METABOLIC PANEL, Hemoglobin        Lots of water. Await labs. Continue montior blood pressure.     (N40.1,  N13.8) Benign prostatic hyperplasia with urinary obstruction  Comment: stable  Plan: per urology. Continue meds.      Patient has been advised of split billing requirements and indicates understanding: Yes      COUNSELING:  Reviewed preventive health counseling, as reflected in patient instructions       Regular exercise       Healthy diet/nutrition       Vision screening        "Hearing screening       Dental care       Bladder control       Fall risk prevention       Alcohol Use       BMI:   Estimated body mass index is 27.6 kg/m  as calculated from the following:    Height as of this encounter: 1.854 m (6' 1\").    Weight as of this encounter: 94.9 kg (209 lb 3.2 oz).   Weight management plan: Discussed healthy diet and exercise guidelines      He reports that he has never smoked. He has never used smokeless tobacco.      Appropriate preventive services were discussed with this patient, including applicable screening as appropriate for cardiovascular disease, diabetes, osteopenia/osteoporosis, and glaucoma.  As appropriate for age/gender, discussed screening for colorectal cancer, prostate cancer, breast cancer, and cervical cancer. Checklist reviewing preventive services available has been given to the patient.    Reviewed patients plan of care and provided an AVS. The Intermediate Care Plan ( asthma action plan, low back pain action plan, and migraine action plan) for Roman meets the Care Plan requirement. This Care Plan has been established and reviewed with the Patient.          Eber Man MD  [facility]    Identified Health Risks:    I have reviewed Opioid Use Disorder and Substance Use Disorder risk factors and made any needed referrals.     "

## 2023-06-21 LAB
ANION GAP SERPL CALCULATED.3IONS-SCNC: 14 MMOL/L (ref 7–15)
BUN SERPL-MCNC: 21.8 MG/DL (ref 8–23)
CALCIUM SERPL-MCNC: 9.2 MG/DL (ref 8.8–10.2)
CHLORIDE SERPL-SCNC: 105 MMOL/L (ref 98–107)
CHOLEST SERPL-MCNC: 128 MG/DL
CREAT SERPL-MCNC: 1.41 MG/DL (ref 0.67–1.17)
DEPRECATED HCO3 PLAS-SCNC: 22 MMOL/L (ref 22–29)
GFR SERPL CREATININE-BSD FRML MDRD: 52 ML/MIN/1.73M2
GLUCOSE SERPL-MCNC: 98 MG/DL (ref 70–99)
HDLC SERPL-MCNC: 46 MG/DL
LDLC SERPL CALC-MCNC: 63 MG/DL
NONHDLC SERPL-MCNC: 82 MG/DL
POTASSIUM SERPL-SCNC: 4.3 MMOL/L (ref 3.4–5.3)
SODIUM SERPL-SCNC: 141 MMOL/L (ref 136–145)
TRIGL SERPL-MCNC: 96 MG/DL

## 2023-07-28 ENCOUNTER — TELEPHONE (OUTPATIENT)
Dept: FAMILY MEDICINE | Facility: CLINIC | Age: 78
End: 2023-07-28
Payer: COMMERCIAL

## 2023-07-28 NOTE — TELEPHONE ENCOUNTER
Pt wife calling, pt saw [facility] and they are saying pt is due for hep c screening.  I reviewed pt chart and ov notes state pt has been screened.  Screening lab seen in chart on 10/28/13.    Please review is this correct?  Does he need another screening now?    Stephanie TEJADAN, RN

## 2023-08-10 ENCOUNTER — TELEPHONE (OUTPATIENT)
Dept: FAMILY MEDICINE | Facility: CLINIC | Age: 78
End: 2023-08-10
Payer: COMMERCIAL

## 2023-08-10 NOTE — TELEPHONE ENCOUNTER
Order/Referral Request    Who is requesting: patients wife    Orders being requested: referral for kidney specialist    Reason service is needed/diagnosis: during last visit with Dr Man at his physical, the results of his blood work, his creatinine levels were high/worsened     When are orders needed by: at your convenience     Has this been discussed with Provider: Yes    Does patient have a preference on a Group/Provider/Facility? [facility]  They do not want to stay within [facility], as they do not want to drive to [place].      Does patient have an appointment scheduled?: No    Where to send orders: Fax    Could we send this information to you in [employer] or would you prefer to receive a phone call?:   Patient would prefer a phone call   Okay to leave a detailed message?: Yes at Cell number on file:    Telephone Information:   Mobile [phone]

## 2023-08-11 NOTE — TELEPHONE ENCOUNTER
Patient's kidney tests aren't high enough for a kidney specialist to see patient. They want to see patient's unless closer to creatine of 2.  Lets' repeat non-fasting lab in mid-fall with a telephone visit after lab and patient do self-monitor blood pressure. Drinks lots of water and avoid ibuprofen type medications. Tylenol ok.  Eber Man MD

## 2023-08-11 NOTE — TELEPHONE ENCOUNTER
There is a consent to communicate with Kaci Calero (wife) on file dated 5/22/2017. (TD-8/11/2023)    I left a message to return a call to [phone].  Mariluz Fyr R.N.

## 2023-08-11 NOTE — TELEPHONE ENCOUNTER
Pt notified of provider message as written.  Pt verbalized good understanding. They do have home blood pressure machine. Advised to take 3-4 times weekly or daily is ok.  Write down blood pressures and pulses. If blood pressure is consistently over 140/90 let us know.   Stephanie TEJADAN, RN

## 2023-10-24 DIAGNOSIS — N13.8 BENIGN PROSTATIC HYPERPLASIA WITH URINARY OBSTRUCTION: ICD-10-CM

## 2023-10-24 DIAGNOSIS — N40.1 BENIGN PROSTATIC HYPERPLASIA WITH URINARY OBSTRUCTION: ICD-10-CM

## 2023-10-24 NOTE — TELEPHONE ENCOUNTER
Requested Prescriptions   Pending Prescriptions Disp Refills    tamsulosin (FLOMAX) 0.4 MG capsule 90 capsule 3     Sig: Take 1 capsule (0.4 mg) by mouth every evening TAKE 1 CAPSULE BY MOUTH EVERY DAY. Keep your appointment to ensure future fills       There is no refill protocol information for this order

## 2023-10-25 RX ORDER — TAMSULOSIN HYDROCHLORIDE 0.4 MG/1
0.4 CAPSULE ORAL EVERY EVENING
Qty: 90 CAPSULE | Refills: 0 | Status: SHIPPED | OUTPATIENT
Start: 2023-10-25 | End: 2024-01-10

## 2023-10-25 NOTE — TELEPHONE ENCOUNTER
Refill prescription approved per [facility] Refill Protocol. Last OV= 12/2022.    Pending Prescriptions:                       Disp   Refills    tamsulosin (FLOMAX) 0.4 MG capsule        90 cap*0            Sig: Take 1 capsule (0.4 mg) by mouth every evening           TAKE 1 CAPSULE BY MOUTH EVERY DAY. Keep your           appointment to ensure future fills        Melanie STEPHEN RN Urology 10/25/2023 8:50 AM

## 2024-01-10 DIAGNOSIS — N13.8 BENIGN PROSTATIC HYPERPLASIA WITH URINARY OBSTRUCTION: ICD-10-CM

## 2024-01-10 DIAGNOSIS — N40.1 BENIGN PROSTATIC HYPERPLASIA WITH URINARY OBSTRUCTION: ICD-10-CM

## 2024-01-10 RX ORDER — TAMSULOSIN HYDROCHLORIDE 0.4 MG/1
0.4 CAPSULE ORAL EVERY EVENING
Qty: 90 CAPSULE | Refills: 0 | Status: SHIPPED | OUTPATIENT
Start: 2024-01-10 | End: 2024-04-02

## 2024-01-10 NOTE — TELEPHONE ENCOUNTER
ONE TIME [facility] refill. Last OV= 12/6/2022. My chart message sent to patient to schedule an appointment for future fills.    Signed Prescriptions:                        Disp   Refills    tamsulosin (FLOMAX) 0.4 MG capsule         90 cap*0        Sig: Take 1 capsule (0.4 mg) by mouth every evening TAKE 1           CAPSULE BY MOUTH EVERY DAY. You must call to           schedule an appointment. This is the last refill.           [phone]  Authorizing Provider: PEDRO LUIS BENITO  Ordering User: ANITA HERNADEZ RN Urology 1/10/2024 9:36 AM

## 2024-04-02 DIAGNOSIS — N13.8 BENIGN PROSTATIC HYPERPLASIA WITH URINARY OBSTRUCTION: ICD-10-CM

## 2024-04-02 DIAGNOSIS — N40.1 BENIGN PROSTATIC HYPERPLASIA WITH URINARY OBSTRUCTION: ICD-10-CM

## 2024-04-02 RX ORDER — TAMSULOSIN HYDROCHLORIDE 0.4 MG/1
0.4 CAPSULE ORAL EVERY EVENING
Qty: 90 CAPSULE | Refills: 0 | Status: SHIPPED | OUTPATIENT
Start: 2024-04-02 | End: 2024-07-18

## 2024-04-02 NOTE — TELEPHONE ENCOUNTER
ONE TIME HARDEEP refill.   Last OV= 12/2022.   Does patient have an upcoming appointment? Yes  Pending Prescriptions:                       Disp   Refills    tamsulosin (FLOMAX) 0.4 MG capsule        90 cap*0            Sig: Take 1 capsule (0.4 mg) by mouth every evening           TAKE 1 CAPSULE BY MOUTH EVERY DAY. You must call           to schedule an appointment. This is the last           refill. [phone]       Melanie STEPHEN RN Urology 4/2/2024 3:40 PM

## 2024-04-24 ENCOUNTER — ANCILLARY PROCEDURE (OUTPATIENT)
Dept: GENERAL RADIOLOGY | Facility: CLINIC | Age: 79
End: 2024-04-24
Attending: ORTHOPAEDIC SURGERY
Payer: COMMERCIAL

## 2024-04-24 ENCOUNTER — OFFICE VISIT (OUTPATIENT)
Dept: ORTHOPEDICS | Facility: CLINIC | Age: 79
End: 2024-04-24
Payer: COMMERCIAL

## 2024-04-24 VITALS
WEIGHT: 215.4 LBS | SYSTOLIC BLOOD PRESSURE: 115 MMHG | BODY MASS INDEX: 28.55 KG/M2 | DIASTOLIC BLOOD PRESSURE: 72 MMHG | HEIGHT: 73 IN | HEART RATE: 77 BPM

## 2024-04-24 DIAGNOSIS — M79.645 PAIN IN FINGER OF BOTH HANDS: ICD-10-CM

## 2024-04-24 DIAGNOSIS — M79.644 PAIN IN FINGER OF BOTH HANDS: ICD-10-CM

## 2024-04-24 DIAGNOSIS — M65.341 TRIGGER FINGER, RIGHT RING FINGER: Primary | ICD-10-CM

## 2024-04-24 PROCEDURE — 73130 X-RAY EXAM OF HAND: CPT | Mod: TC | Performed by: RADIOLOGY

## 2024-04-24 PROCEDURE — 20550 NJX 1 TENDON SHEATH/LIGAMENT: CPT | Mod: F8 | Performed by: ORTHOPAEDIC SURGERY

## 2024-04-24 PROCEDURE — 99213 OFFICE O/P EST LOW 20 MIN: CPT | Mod: 25 | Performed by: ORTHOPAEDIC SURGERY

## 2024-04-24 RX ORDER — TRIAMCINOLONE ACETONIDE 40 MG/ML
20 INJECTION, SUSPENSION INTRA-ARTICULAR; INTRAMUSCULAR
Status: SHIPPED | OUTPATIENT
Start: 2024-04-24

## 2024-04-24 RX ORDER — BUPIVACAINE HYDROCHLORIDE 2.5 MG/ML
0.5 INJECTION, SOLUTION INFILTRATION; PERINEURAL
Status: SHIPPED | OUTPATIENT
Start: 2024-04-24

## 2024-04-24 RX ADMIN — TRIAMCINOLONE ACETONIDE 20 MG: 40 INJECTION, SUSPENSION INTRA-ARTICULAR; INTRAMUSCULAR at 15:09

## 2024-04-24 RX ADMIN — BUPIVACAINE HYDROCHLORIDE 0.5 ML: 2.5 INJECTION, SOLUTION INFILTRATION; PERINEURAL at 15:09

## 2024-04-24 ASSESSMENT — PAIN SCALES - GENERAL: PAINLEVEL: EXTREME PAIN (8)

## 2024-04-24 NOTE — PROGRESS NOTES
CHIEF COMPLAINT:   Chief Complaint   Patient presents with    Right Ring Finger - Pain     Onset: 1 year. Hx of left index trigger finger. He is having issues with is right ring finger today. He would like xrays of bilateral hands today. He will get locking in the finger and will have to manually open it at times. He is concerned for arthritis. He would like to try an injection today.        HISTORY:  Roman Calero is a 79 year old male , Right -hand dominant who is seen in consultation at the request of Osman Man,for Right hand ring fingerpain and catching x 1 years.  The finger will lock and he'll have to manually open it up at times.  Maybe some numbness in the fingers. No tingling.    He's concerned of arthritis, stiffness in the fingers.    Had a left index finger trigger injection with Dr. Shasta Razo in the past, 9/2/2021, didn't really help. He had an injection prior to then that helped though. The left index finger just doesn't bend far, not really pain so never has done anything with it.    He'd like to try right ring finger injection today though.    History of left thumb trigger release in the past.    Suspected cause: Due to unknown factors.    Pain severity: 8/10  Pain quality: sharp  Frequency of symptoms: frequently.  Usual level of work activity: retired.    Other PMH:  has a past medical history of Cancer (H) (skin) and Heart disease.    He has no past medical history of Arthritis, Cerebral infarction (H), Congestive heart failure (H), COPD (chronic obstructive pulmonary disease) (H), Depressive disorder, Diabetes (H), History of blood transfusion, Hypertension, Thyroid disease, or Uncomplicated asthma.  Patient Active Problem List   Diagnosis    HDL deficiency    Elevated PSA    Trigger thumb of left hand    Hyperlipidemia LDL goal <130    Benign prostatic hyperplasia with urinary obstruction    Encounter for counseling    10 year risk of MI or stroke 7.5% or greater, 12.9% in December 2015 on  Simvastatin 20    Coronary artery disease involving native coronary artery of native heart without angina pectoris    AMI (acute mesenteric ischemia)    CKD (chronic kidney disease) stage 3, GFR 30-59 ml/min (H)    Wears hearing aid       Surgical Hx:  has a past surgical history that includes biopsy; Cardiac surgery; colonoscopy; and Soft tissue surgery.    Medications:   Current Outpatient Medications:     atorvastatin (LIPITOR) 40 MG tablet, Take 40 mg by mouth daily ADDIS., Disp: , Rfl:     loratadine (CLARITIN) 10 MG tablet, Take 10 mg by mouth daily, Disp: , Rfl:     metoprolol (LOPRESSOR) 25 MG tablet, Take 0.25 mg by mouth 2 times daily , Disp: , Rfl:     Misc Natural Products (FIBER 7 PO), Take 2 tablets by mouth daily., Disp: , Rfl:     Multiple Vitamins-Minerals (VITRUM 50+ SENIOR MULTI) TABS, Take 1 tablet by mouth daily, Disp: , Rfl:     tamsulosin (FLOMAX) 0.4 MG capsule, Take 1 capsule (0.4 mg) by mouth every evening TAKE 1 CAPSULE BY MOUTH EVERY DAY. You must call to schedule an appointment. This is the last refill. [phone], Disp: 90 capsule, Rfl: 0    aspirin 81 MG tablet, Take 1 tablet by mouth daily. (Patient not taking: Reported on 4/24/2024), Disp: , Rfl:     Calcium Polycarbophil (FIBER) 625 MG tablet, Take 1,350 mg by mouth daily, Disp: , Rfl:     clopidogrel (PLAVIX) 75 MG tablet, Take 75 mg by mouth daily (Patient not taking: Reported on 4/24/2024), Disp: , Rfl:     lisinopril (ZESTRIL) 2.5 MG tablet, Take 2.5 mg by mouth daily (Patient not taking: Reported on 4/24/2024), Disp: , Rfl:     nitroGLYcerin (NITROSTAT) 0.4 MG sublingual tablet, Place 0.4 mg under the tongue every 5 minutes as needed for chest pain if you are still having symptoms after 3 doses (15 minutes) call 911. (Patient not taking: Reported on 4/24/2024), Disp: , Rfl:     Allergies:   Allergies   Allergen Reactions    Seasonal Allergies Other (See Comments)       Social Hx: retired.   reports that he has never smoked. He  "has never used smokeless tobacco. He reports that he does not currently use alcohol. He reports that he does not use drugs.    Family Hx: family history includes Cancer in his father; Dementia in his mother; Hypertension in his mother; Lipids in his mother; Other Cancer in his father..     REVIEW OF SYSTEMS: 10 point ROS neg other than the symptoms noted above in the HPI and PMH. Notables include  CONSTITUTIONAL:NEGATIVE for fever, chills, change in weight  INTEGUMENTARY/SKIN: NEGATIVE for worrisome rashes, moles or lesions  MUSCULOSKELETAL:See HPI above  Neurology: see HPI above.      EXAM:  /72   Pulse 77   Ht 1.854 m (6' 1\")   Wt 97.7 kg (215 lb 6.4 oz)   BMI 28.42 kg/m      GENERAL APPEARANCE: healthy, alert and no distress ; accompanied by his wife today.  GAIT: NORMAL  SKIN: no suspicious lesions or rashes  RESPIRATORY: No increased work of breathing.  NEURO:  strength: decreased, thenar fasiculations:negative;  Thenar atrophy:Mild. Sensation intact in  all digits, reflexes normal in upper extremities.   PSYCH:  mentation appears normal and affect normal/bright, not anxious.    MUSCULOSKELETAL:    RIGHT HAND/FINGERS:   Skin intact. Normal wear pattern, color and tone.   No clubbing or nail pitting.  Range of Motion decreased right ring finger  Possible slight dupuytrens cord ring finger without contracture.       Good capillary refill to all fingers. 2+ radial pulse.    Triggering noted: right ring finger  Tenderness over A1 pulley of right ring finger.      1st carpometacarpal grind: positive     Intact EPL, FPL, FDP, EDC, wrist flexion/extension, biceps/triceps  Intact sensation to light touch in median, radial and ulnar nerve distributions.      LEFT HAND/FINGERS:   Skin intact. Normal wear pattern, color and tone.   No clubbing or nail pitting.  Range of Motion decreased left index finger with pain.    Dupuytrens cord, nodules of the left small finger with slight contracture.    Good " capillary refill to all fingers. 2+ radial pulse.    Triggering noted: none  Tenderness over A1 pulley of none of the fingers.      1st carpometacarpal grind: positive     Intact EPL, FPL, FDP, EDC, wrist flexion/extension, biceps/triceps  Intact sensation to light touch in median, radial and ulnar nerve distributions.      XRAYS: 3 views bilateral hands 4/24/2024 reviewed, Both hands are negative for fracture or dislocation. Degenerative change at the STT joint and first CMC joint bilaterally. Degenerative change at the right first MCP joint. No erosive changes are identified..      ASSESSMENT: 78yo RHD male with right ring finger trigger finger. Bilateral hand osteoarthritis.        PLAN:     We talked about the options: taping the PIP, splinting the PIP joint, corticosteroid injections, hand therapy and surgery. I explained the risks and benefits of each, including recurrence. I have explained the nature of the surgical procedure, the risks and recovery time with the patient.  * At this time, patient would like to proceed with: injection  * A Right ring  trigger injection was offered and patient elected to proceed. Tolerated well without apparent complication. See procedure note below.  * an oval-8 finger splint was provided  * Return to clinic as needed.    Dyllan Rordiguez M.D., M.S.  Dept. of Orthopaedic Surgery  [facility]     Hand / Upper Extremity Injection/Arthrocentesis: R ring A1    Date/Time: 4/24/2024 3:09 PM    Performed by: Leonel Donahue PA  Authorized by: Dyllan Rodriguez MD    Indications:  Pain  Needle Size:  25 G  Guidance: landmark    Approach:  Volar  Condition: trigger finger    Location:  Ring finger    Site:  R ring A1  Medications:  20 mg triamcinolone 40 MG/ML; 0.5 mL BUPivacaine 0.25 %  Outcome:  Tolerated well, no immediate complications  Procedure discussed: discussed risks, benefits, and alternatives    Consent Given by:  Patient  Prep: patient was prepped and  draped in usual sterile fashion

## 2024-04-24 NOTE — LETTER
4/24/2024         RE: Roman Calero  [id number] [facility] [id number]        Dear Colleague,    Thank you for referring your patient, Roman Calero, to the [facility]. Please see a copy of my visit note below.    CHIEF COMPLAINT:   Chief Complaint   Patient presents with     Right Ring Finger - Pain     Onset: 1 year. Hx of left index trigger finger. He is having issues with is right ring finger today. He would like xrays of bilateral hands today. He will get locking in the finger and will have to manually open it at times. He is concerned for arthritis. He would like to try an injection today.        HISTORY:  Roman Calero is a 79 year old male , Right -hand dominant who is seen in consultation at the request of Osman Man,for Right hand ring fingerpain and catching x 1 years.  The finger will lock and he'll have to manually open it up at times.  Maybe some numbness in the fingers. No tingling.    He's concerned of arthritis, stiffness in the fingers.    Had a left index finger trigger injection with Dr. Shasta Razo in the past, 9/2/2021, didn't really help. He had an injection prior to then that helped though. The left index finger just doesn't bend far, not really pain so never has done anything with it.    He'd like to try right ring finger injection today though.    History of left thumb trigger release in the past.    Suspected cause: Due to unknown factors.    Pain severity: 8/10  Pain quality: sharp  Frequency of symptoms: frequently.  Usual level of work activity: retired.    Other PMH:  has a past medical history of Cancer (H) (skin) and Heart disease.    He has no past medical history of Arthritis, Cerebral infarction (H), Congestive heart failure (H), COPD (chronic obstructive pulmonary disease) (H), Depressive disorder, Diabetes (H), History of blood transfusion, Hypertension, Thyroid disease, or Uncomplicated asthma.  Patient Active Problem List    Diagnosis     HDL deficiency     Elevated PSA     Trigger thumb of left hand     Hyperlipidemia LDL goal <130     Benign prostatic hyperplasia with urinary obstruction     Encounter for counseling     10 year risk of MI or stroke 7.5% or greater, 12.9% in December 2015 on Simvastatin 20     Coronary artery disease involving native coronary artery of native heart without angina pectoris     AMI (acute mesenteric ischemia)     CKD (chronic kidney disease) stage 3, GFR 30-59 ml/min (H)     Wears hearing aid       Surgical Hx:  has a past surgical history that includes biopsy; Cardiac surgery; colonoscopy; and Soft tissue surgery.    Medications:   Current Outpatient Medications:      atorvastatin (LIPITOR) 40 MG tablet, Take 40 mg by mouth daily ADDIS., Disp: , Rfl:      loratadine (CLARITIN) 10 MG tablet, Take 10 mg by mouth daily, Disp: , Rfl:      metoprolol (LOPRESSOR) 25 MG tablet, Take 0.25 mg by mouth 2 times daily , Disp: , Rfl:      Misc Natural Products (FIBER 7 PO), Take 2 tablets by mouth daily., Disp: , Rfl:      Multiple Vitamins-Minerals (VITRUM 50+ SENIOR MULTI) TABS, Take 1 tablet by mouth daily, Disp: , Rfl:      tamsulosin (FLOMAX) 0.4 MG capsule, Take 1 capsule (0.4 mg) by mouth every evening TAKE 1 CAPSULE BY MOUTH EVERY DAY. You must call to schedule an appointment. This is the last refill. [phone], Disp: 90 capsule, Rfl: 0     aspirin 81 MG tablet, Take 1 tablet by mouth daily. (Patient not taking: Reported on 4/24/2024), Disp: , Rfl:      Calcium Polycarbophil (FIBER) 625 MG tablet, Take 1,350 mg by mouth daily, Disp: , Rfl:      clopidogrel (PLAVIX) 75 MG tablet, Take 75 mg by mouth daily (Patient not taking: Reported on 4/24/2024), Disp: , Rfl:      lisinopril (ZESTRIL) 2.5 MG tablet, Take 2.5 mg by mouth daily (Patient not taking: Reported on 4/24/2024), Disp: , Rfl:      nitroGLYcerin (NITROSTAT) 0.4 MG sublingual tablet, Place 0.4 mg under the tongue every 5 minutes as needed for chest  "pain if you are still having symptoms after 3 doses (15 minutes) call 911. (Patient not taking: Reported on 4/24/2024), Disp: , Rfl:     Allergies:   Allergies   Allergen Reactions     Seasonal Allergies Other (See Comments)       Social Hx: retired.   reports that he has never smoked. He has never used smokeless tobacco. He reports that he does not currently use alcohol. He reports that he does not use drugs.    Family Hx: family history includes Cancer in his father; Dementia in his mother; Hypertension in his mother; Lipids in his mother; Other Cancer in his father..     REVIEW OF SYSTEMS: 10 point ROS neg other than the symptoms noted above in the HPI and PMH. Notables include  CONSTITUTIONAL:NEGATIVE for fever, chills, change in weight  INTEGUMENTARY/SKIN: NEGATIVE for worrisome rashes, moles or lesions  MUSCULOSKELETAL:See HPI above  Neurology: see HPI above.      EXAM:  /72   Pulse 77   Ht 1.854 m (6' 1\")   Wt 97.7 kg (215 lb 6.4 oz)   BMI 28.42 kg/m      GENERAL APPEARANCE: healthy, alert and no distress ; accompanied by his wife today.  GAIT: NORMAL  SKIN: no suspicious lesions or rashes  RESPIRATORY: No increased work of breathing.  NEURO:  strength: decreased, thenar fasiculations:negative;  Thenar atrophy:Mild. Sensation intact in  all digits, reflexes normal in upper extremities.   PSYCH:  mentation appears normal and affect normal/bright, not anxious.    MUSCULOSKELETAL:    RIGHT HAND/FINGERS:   Skin intact. Normal wear pattern, color and tone.   No clubbing or nail pitting.  Range of Motion decreased right ring finger  Possible slight dupuytrens cord ring finger without contracture.       Good capillary refill to all fingers. 2+ radial pulse.    Triggering noted: right ring finger  Tenderness over A1 pulley of right ring finger.      1st carpometacarpal grind: positive     Intact EPL, FPL, FDP, EDC, wrist flexion/extension, biceps/triceps  Intact sensation to light touch in median, " radial and ulnar nerve distributions.      LEFT HAND/FINGERS:   Skin intact. Normal wear pattern, color and tone.   No clubbing or nail pitting.  Range of Motion decreased left index finger with pain.    Dupuytrens cord, nodules of the left small finger with slight contracture.    Good capillary refill to all fingers. 2+ radial pulse.    Triggering noted: none  Tenderness over A1 pulley of none of the fingers.      1st carpometacarpal grind: positive     Intact EPL, FPL, FDP, EDC, wrist flexion/extension, biceps/triceps  Intact sensation to light touch in median, radial and ulnar nerve distributions.      XRAYS: 3 views bilateral hands 4/24/2024 reviewed, Both hands are negative for fracture or dislocation. Degenerative change at the STT joint and first CMC joint bilaterally. Degenerative change at the right first MCP joint. No erosive changes are identified..      ASSESSMENT: 80yo RHD male with right ring finger trigger finger. Bilateral hand osteoarthritis.        PLAN:     We talked about the options: taping the PIP, splinting the PIP joint, corticosteroid injections, hand therapy and surgery. I explained the risks and benefits of each, including recurrence. I have explained the nature of the surgical procedure, the risks and recovery time with the patient.  * At this time, patient would like to proceed with: injection  * A Right ring  trigger injection was offered and patient elected to proceed. Tolerated well without apparent complication. See procedure note below.  * an oval-8 finger splint was provided  * Return to clinic as needed.    Dyllan Rodriguez M.D., M.S.  Dept. of Orthopaedic Surgery  [facility]     Hand / Upper Extremity Injection/Arthrocentesis: R ring A1    Date/Time: 4/24/2024 3:09 PM    Performed by: Leonel Donahue PA  Authorized by: Dyllan Rodriguez MD    Indications:  Pain  Needle Size:  25 G  Guidance: landmark    Approach:  Volar  Condition: trigger finger    Location:   Ring finger    Site:  R ring A1  Medications:  20 mg triamcinolone 40 MG/ML; 0.5 mL BUPivacaine 0.25 %  Outcome:  Tolerated well, no immediate complications  Procedure discussed: discussed risks, benefits, and alternatives    Consent Given by:  Patient  Prep: patient was prepped and draped in usual sterile fashion          Again, thank you for allowing me to participate in the care of your patient.        Sincerely,        Dyllan Rodriguez MD

## 2024-05-31 ENCOUNTER — OFFICE VISIT (OUTPATIENT)
Dept: UROLOGY | Facility: CLINIC | Age: 79
End: 2024-05-31
Payer: COMMERCIAL

## 2024-05-31 VITALS — DIASTOLIC BLOOD PRESSURE: 72 MMHG | HEART RATE: 77 BPM | SYSTOLIC BLOOD PRESSURE: 115 MMHG

## 2024-05-31 DIAGNOSIS — N40.1 BENIGN PROSTATIC HYPERPLASIA WITH URINARY OBSTRUCTION: Primary | ICD-10-CM

## 2024-05-31 DIAGNOSIS — R35.0 URINARY FREQUENCY: ICD-10-CM

## 2024-05-31 DIAGNOSIS — N13.8 BENIGN PROSTATIC HYPERPLASIA WITH URINARY OBSTRUCTION: Primary | ICD-10-CM

## 2024-05-31 LAB
ALBUMIN UR-MCNC: NEGATIVE MG/DL
APPEARANCE UR: CLEAR
BILIRUB UR QL STRIP: NEGATIVE
COLOR UR AUTO: YELLOW
GLUCOSE UR STRIP-MCNC: NEGATIVE MG/DL
HGB UR QL STRIP: NEGATIVE
KETONES UR STRIP-MCNC: NEGATIVE MG/DL
LEUKOCYTE ESTERASE UR QL STRIP: NEGATIVE
NITRATE UR QL: NEGATIVE
PH UR STRIP: 7 [PH] (ref 5–7)
SP GR UR STRIP: 1.02 (ref 1–1.03)
UROBILINOGEN UR STRIP-ACNC: 0.2 E.U./DL

## 2024-05-31 PROCEDURE — 51798 US URINE CAPACITY MEASURE: CPT | Performed by: UROLOGY

## 2024-05-31 PROCEDURE — 99213 OFFICE O/P EST LOW 20 MIN: CPT | Mod: 25 | Performed by: UROLOGY

## 2024-05-31 PROCEDURE — 81003 URINALYSIS AUTO W/O SCOPE: CPT | Performed by: UROLOGY

## 2024-05-31 RX ORDER — MIRABEGRON 50 MG/1
50 TABLET, EXTENDED RELEASE ORAL DAILY
Qty: 30 TABLET | Refills: 1 | Status: SHIPPED | OUTPATIENT
Start: 2024-05-31 | End: 2024-06-27

## 2024-05-31 NOTE — PROGRESS NOTES
No chief complaint on file.      Roman Calero is a 79 year old male who presents today for follow up of No chief complaint on file.   Follow up for benign prostatic hyperplasia.  He is on flomax.  He took proscar but had some side effects from it.  He c/o frequency and nocturia.  Flow is ok.  He has no dysuria.    Current Outpatient Medications   Medication Sig Dispense Refill    mirabegron (MYRBETRIQ) 50 MG 24 hr tablet Take 1 tablet (50 mg) by mouth daily 30 tablet 1    aspirin 81 MG tablet Take 1 tablet by mouth daily. (Patient not taking: Reported on 4/24/2024)      atorvastatin (LIPITOR) 40 MG tablet Take 40 mg by mouth daily ADDIS.      Calcium Polycarbophil (FIBER) 625 MG tablet Take 1,350 mg by mouth daily      clopidogrel (PLAVIX) 75 MG tablet Take 75 mg by mouth daily (Patient not taking: Reported on 4/24/2024)      lisinopril (ZESTRIL) 2.5 MG tablet Take 2.5 mg by mouth daily (Patient not taking: Reported on 4/24/2024)      loratadine (CLARITIN) 10 MG tablet Take 10 mg by mouth daily      metoprolol (LOPRESSOR) 25 MG tablet Take 0.25 mg by mouth 2 times daily       Misc Natural Products (FIBER 7 PO) Take 2 tablets by mouth daily.      Multiple Vitamins-Minerals (VITRUM 50+ SENIOR MULTI) TABS Take 1 tablet by mouth daily      nitroGLYcerin (NITROSTAT) 0.4 MG sublingual tablet Place 0.4 mg under the tongue every 5 minutes as needed for chest pain if you are still having symptoms after 3 doses (15 minutes) call 911. (Patient not taking: Reported on 4/24/2024)      tamsulosin (FLOMAX) 0.4 MG capsule Take 1 capsule (0.4 mg) by mouth every evening TAKE 1 CAPSULE BY MOUTH EVERY DAY. You must call to schedule an appointment. This is the last refill. [phone] 90 capsule 0     Allergies   Allergen Reactions    Seasonal Allergies Other (See Comments)      Past Medical History:   Diagnosis Date    Cancer (H) skin    Heart disease      Past Surgical History:   Procedure Laterality Date    BIOPSY      CARDIAC  SURGERY      COLONOSCOPY      SOFT TISSUE SURGERY       Family History   Problem Relation Age of Onset    Hypertension Mother     Lipids Mother     Dementia Mother     Cancer Father         Hodgkin's age 63    Other Cancer Father      Social History     Socioeconomic History    Marital status:    Tobacco Use    Smoking status: Never    Smokeless tobacco: Never   Vaping Use    Vaping status: Never Used   Substance and Sexual Activity    Alcohol use: Not Currently     Comment: occ    Drug use: No    Sexual activity: Not Currently     Partners: Female     Birth control/protection: None   Other Topics Concern    Parent/sibling w/ CABG, MI or angioplasty before 65F 55M? No     Social Determinants of Health      Received from [employer]    Financial Resource Strain    Received from [employer]    Social Connections       REVIEW OF SYSTEMS  =================  C: NEGATIVE for fever, chills, change in weight  I: NEGATIVE for worrisome rashes, moles or lesions  E/M: NEGATIVE for ear, mouth and throat problems  R: NEGATIVE for significant cough or SHORTNESS OF BREATH  CV:  NEGATIVE for chest pain, palpitations or peripheral edema  GI: NEGATIVE for nausea, abdominal pain, heartburn, or change in bowel habits  NEURO: NEGATIVE numbness/weakness  : see HPI  PSYCH: NEGATIVE depression/anxiety  LYmph: no new enlarged lymph nodes  Ortho: no new trauma/movements    Physical Exam:  Blood pressure 115/72, pulse 77.    GENERAL: healthy, alert and no distress  EYES: Eyes grossly normal to inspection, conjunctivae and sclerae normal  RESP: no audible wheeze, cough, or visible cyanosis.  No visible retractions or increased work of breathing.  Able to speak fully in complete sentences.  NEURO: Cranial nerves grossly intact, mentation intact and speech normal  PSYCH: mentation  appears normal, affect normal/bright, judgement and insight intact, normal speech and appearance well-groomed    Assessment/Plan:   (N40.1,  N13.8) Benign prostatic hyperplasia with urinary obstruction  (primary encounter diagnosis)  Comment: PVR < 50 ml  Plan: MEASURE POST-VOID RESIDUAL URINE/BLADDER         CAPACITY, US NON-IMAGING, UA Macroscopic with         reflex to Microscopic and Culture        Cont with flomax    (R35.0) Urinary frequency  Comment:    Plan: mirabegron (MYRBETRIQ) 50 MG 24 hr tablet           Patient to call in one month for response.

## 2024-06-26 ENCOUNTER — MYC MEDICAL ADVICE (OUTPATIENT)
Dept: UROLOGY | Facility: CLINIC | Age: 79
End: 2024-06-26
Payer: COMMERCIAL

## 2024-06-26 DIAGNOSIS — R35.0 URINARY FREQUENCY: ICD-10-CM

## 2024-06-27 RX ORDER — MIRABEGRON 50 MG/1
50 TABLET, EXTENDED RELEASE ORAL DAILY
Qty: 90 TABLET | Refills: 3 | Status: SHIPPED | OUTPATIENT
Start: 2024-06-27

## 2024-06-27 NOTE — TELEPHONE ENCOUNTER
Per patient's Piqniqhart message patient states this medication is effective.  Patient used to get up multiple times at night and now only gets up 1 time per night.  Refilled x 1 year. Signed Prescriptions:                        Disp   Refills    mirabegron (MYRBETRIQ) 50 MG 24 hr tablet  90 tab*3        Sig: Take 1 tablet (50 mg) by mouth daily  Authorizing Provider: PEDRO LUIS BENITO  Ordering User: ANITA HERNADEZ RN   [facility], Urology   6/27/2024 9:27 AM

## 2024-07-15 SDOH — HEALTH STABILITY: PHYSICAL HEALTH: ON AVERAGE, HOW MANY MINUTES DO YOU ENGAGE IN EXERCISE AT THIS LEVEL?: 30 MIN

## 2024-07-15 SDOH — HEALTH STABILITY: PHYSICAL HEALTH: ON AVERAGE, HOW MANY DAYS PER WEEK DO YOU ENGAGE IN MODERATE TO STRENUOUS EXERCISE (LIKE A BRISK WALK)?: 3 DAYS

## 2024-07-15 ASSESSMENT — SOCIAL DETERMINANTS OF HEALTH (SDOH): HOW OFTEN DO YOU GET TOGETHER WITH FRIENDS OR RELATIVES?: MORE THAN THREE TIMES A WEEK

## 2024-07-16 RX ORDER — RESPIRATORY SYNCYTIAL VIRUS VACCINE 120MCG/0.5
0.5 KIT INTRAMUSCULAR ONCE
Qty: 1 EACH | Refills: 0 | Status: CANCELLED | OUTPATIENT
Start: 2024-07-16 | End: 2024-07-16

## 2024-07-18 ENCOUNTER — OFFICE VISIT (OUTPATIENT)
Dept: FAMILY MEDICINE | Facility: CLINIC | Age: 79
End: 2024-07-18
Payer: COMMERCIAL

## 2024-07-18 VITALS
BODY MASS INDEX: 27.34 KG/M2 | OXYGEN SATURATION: 98 % | SYSTOLIC BLOOD PRESSURE: 111 MMHG | TEMPERATURE: 97.9 F | WEIGHT: 213 LBS | RESPIRATION RATE: 20 BRPM | DIASTOLIC BLOOD PRESSURE: 69 MMHG | HEIGHT: 74 IN | HEART RATE: 70 BPM

## 2024-07-18 DIAGNOSIS — E78.5 HYPERLIPIDEMIA LDL GOAL <130: ICD-10-CM

## 2024-07-18 DIAGNOSIS — I25.10 CORONARY ARTERY DISEASE INVOLVING NATIVE CORONARY ARTERY OF NATIVE HEART WITHOUT ANGINA PECTORIS: ICD-10-CM

## 2024-07-18 DIAGNOSIS — N40.1 BENIGN PROSTATIC HYPERPLASIA WITH URINARY OBSTRUCTION: ICD-10-CM

## 2024-07-18 DIAGNOSIS — N18.30 STAGE 3 CHRONIC KIDNEY DISEASE, UNSPECIFIED WHETHER STAGE 3A OR 3B CKD (H): ICD-10-CM

## 2024-07-18 DIAGNOSIS — N13.8 BENIGN PROSTATIC HYPERPLASIA WITH URINARY OBSTRUCTION: ICD-10-CM

## 2024-07-18 DIAGNOSIS — Z00.00 ENCOUNTER FOR SUBSEQUENT ANNUAL WELLNESS VISIT IN MEDICARE PATIENT: Primary | ICD-10-CM

## 2024-07-18 LAB — HGB BLD-MCNC: 15.1 G/DL (ref 13.3–17.7)

## 2024-07-18 PROCEDURE — 85018 HEMOGLOBIN: CPT | Performed by: FAMILY MEDICINE

## 2024-07-18 PROCEDURE — 80069 RENAL FUNCTION PANEL: CPT | Performed by: FAMILY MEDICINE

## 2024-07-18 PROCEDURE — 99214 OFFICE O/P EST MOD 30 MIN: CPT | Mod: 25 | Performed by: FAMILY MEDICINE

## 2024-07-18 PROCEDURE — 36415 COLL VENOUS BLD VENIPUNCTURE: CPT | Performed by: FAMILY MEDICINE

## 2024-07-18 PROCEDURE — 80061 LIPID PANEL: CPT | Performed by: FAMILY MEDICINE

## 2024-07-18 PROCEDURE — G0439 PPPS, SUBSEQ VISIT: HCPCS | Performed by: FAMILY MEDICINE

## 2024-07-18 PROCEDURE — 69209 REMOVE IMPACTED EAR WAX UNI: CPT | Mod: 50 | Performed by: FAMILY MEDICINE

## 2024-07-18 RX ORDER — NITROGLYCERIN 0.4 MG/1
0.4 TABLET SUBLINGUAL EVERY 5 MIN PRN
Qty: 20 TABLET | Refills: 1 | Status: SHIPPED | OUTPATIENT
Start: 2024-07-18

## 2024-07-18 RX ORDER — ASPIRIN 81 MG/1
81 TABLET ORAL DAILY
COMMUNITY

## 2024-07-18 RX ORDER — TAMSULOSIN HYDROCHLORIDE 0.4 MG/1
0.4 CAPSULE ORAL EVERY EVENING
Qty: 90 CAPSULE | Refills: 0 | Status: SHIPPED | OUTPATIENT
Start: 2024-07-18

## 2024-07-18 ASSESSMENT — PAIN SCALES - GENERAL: PAINLEVEL: NO PAIN (0)

## 2024-07-18 NOTE — NURSING NOTE
Patient identified using two patient identifiers.  Ear exam showing wax occlusion completed by provider.  Solution: warm water was placed in the bilateral ear(s) via  Syringe .

## 2024-07-18 NOTE — PROGRESS NOTES
Preventive Care Visit  [facility]  Eber Man MD, Family Medicine  Jul 18, 2024      ASSESSMENT / PLAN:  (Z00.00) Encounter for subsequent annual wellness visit in Medicare patient  (primary encounter diagnosis)  Comment: generally healthy and normal exam. Memory ok and no falls. Good support system  Plan: continue vitaminD/exercise.     (N40.1,  N13.8) Benign prostatic hyperplasia with urinary obstruction  Comment: stable  Plan: tamsulosin (FLOMAX) 0.4 MG capsule,         OFFICE/OUTPT VISIT,EST,LEVL IV        Per urology too. If blood pressure too low might need to change medication    (I25.10) Coronary artery disease involving native coronary artery of native heart without angina pectoris  Comment: stable  Plan: nitroGLYcerin (NITROSTAT) 0.4 MG sublingual         tablet, OFFICE/OUTPT VISIT,EST,LEVL IV        Seeing cardiology in fall. If chest pain or shortness of breath to er    (N18.30) Stage 3 chronic kidney disease, unspecified whether stage 3a or 3b CKD (H)  Comment: blood pressure a little low  Plan: Renal panel, Hemoglobin, OFFICE/OUTPT         VISIT,EST,LEVL IV        Continue self-monitor blood pressure and keep hydrated. Fasting today but no nsaids and no recent workouts.     (E78.5) Hyperlipidemia LDL goal <130  Comment: ok in past  Plan: Lipid Profile, OFFICE/OUTPT VISIT,EST,LEVL IV        Continue statin    Over the counter debrox for future ear wax    Angel Owens is a 79 year old, presenting for the following:  Wellness Visit  History cad, htn, ALLERGIC RHINITIS, CRI, high cholesterol and BPH (seen urology recently). .   Seeing metrocards seeing in fall.. No nitrog needed. Good exercise tolerance.   No nausea, vomiting or diarrhea or black/bloody stools. No urine changes or hematuria.   , 2 children.  No grand children.  Kids in [place] - helpful.  Wife doing ok 1 year younger.   No falls. Memory ok.   Sleep overall ok with melatonin. No major  snoring.  Emotionally doing ok.   Balanced diet. VitaminD/milk. [place] in winter.   No rashes/mole changes.   Dentist x2/year.  Eye last week. Dermatology once year - non-melanoma.    No outside blood pressure reading. Walking a lot.  Water intake ok. No LIGHTHEADED.           7/18/2024    11:10 AM   Additional Questions   Roomed by DONNA Elizabeth CMA         Health Care Directive  Patient has a Health Care Directive on file  Advance care planning document is on file and is current.    HPI            7/15/2024   General Health   How would you rate your overall physical health? Good   Feel stress (tense, anxious, or unable to sleep) Not at all            7/15/2024   Nutrition   Diet: Low fat/cholesterol            7/15/2024   Exercise   Days per week of moderate/strenous exercise 3 days   Average minutes spent exercising at this level 30 min            7/15/2024   Social Factors   Frequency of gathering with friends or relatives More than three times a week   Worry food won't last until get money to buy more No   Food not last or not have enough money for food? No   Do you have housing? (Housing is defined as stable permanent housing and does not include staying ouside in a car, in a tent, in an abandoned building, in an overnight shelter, or couch-surfing.) Yes   Are you worried about losing your housing? No   Lack of transportation? No   Unable to get utilities (heat,electricity)? No            7/15/2024   Fall Risk   Fallen 2 or more times in the past year? No    No   Trouble with walking or balance? No    No       Multiple values from one day are sorted in reverse-chronological order          7/15/2024   Activities of Daily Living- Home Safety   Needs help with the following daily activites None of the above   Safety concerns in the home No grab bars in the bathroom            7/15/2024   Dental   Dentist two times every year? Yes            7/15/2024   Hearing Screening   Hearing concerns? (!) I FEEL THAT PEOPLE ARE  MUMBLING OR NOT SPEAKING CLEARLY.    (!) I NEED TO ASK PEOPLE TO SPEAK UP OR REPEAT THEMSELVES.       Multiple values from one day are sorted in reverse-chronological order         7/15/2024   Driving Risk Screening   Patient/family members have concerns about driving No            7/15/2024   General Alertness/Fatigue Screening   Have you been more tired than usual lately? No            7/15/2024   Urinary Incontinence Screening   Bothered by leaking urine in past 6 months No            7/15/2024   TB Screening   Were you born outside of the [place]? No            Today's PHQ-2 Score:       7/17/2024    12:14 PM   PHQ-2 ( 1999 [employer])   Q1: Little interest or pleasure in doing things 0   Q2: Feeling down, depressed or hopeless 0   PHQ-2 Score 0   Q1: Little interest or pleasure in doing things Not at all   Q2: Feeling down, depressed or hopeless Not at all   PHQ-2 Score 0           7/15/2024   Substance Use   Alcohol more than 3/day or more than 7/wk Not Applicable   Do you have a current opioid prescription? No   How severe/bad is pain from 1 to 10? 0/10 (No Pain)   Do you use any other substances recreationally? No        Social History     Tobacco Use    Smoking status: Never    Smokeless tobacco: Never   Vaping Use    Vaping status: Never Used   Substance Use Topics    Alcohol use: Not Currently     Comment: occ    Drug use: No       ASCVD Risk   The ASCVD Risk score (Ivelisse DK, et al., 2019) failed to calculate for the following reasons:    The valid total cholesterol range is 130 to 320 mg/dL            Reviewed and updated as needed this visit by Provider                      Current providers sharing in care for this patient include:  Patient Care Team:  Osman Man MD as PCP - General  Maik Jade MD as Assigned Surgical Provider  Keith Man MD as MD (Family Medicine)  Eber Man MD as Assigned PCP  Dyllan Rodriguez MD as Assigned Musculoskeletal Provider    The following health  "maintenance items are reviewed in Epic and correct as of today:  Health Maintenance   Topic Date Due    ANNUAL REVIEW OF HM ORDERS  Never done    RSV VACCINE (Pregnancy & 60+) (1 - 1-dose 60+ series) Never done    MICROALBUMIN  05/23/2023    COVID-19 Vaccine (6 - 2023-24 season) 02/20/2024    BMP  06/20/2024    LIPID  06/20/2024    MEDICARE ANNUAL WELLNESS VISIT  06/20/2024    HEMOGLOBIN  06/20/2024    INFLUENZA VACCINE (1) 09/01/2024    FALL RISK ASSESSMENT  07/18/2025    GLUCOSE  06/20/2026    ADVANCE CARE PLANNING  06/20/2028    DTAP/TDAP/TD IMMUNIZATION (3 - Td or Tdap) 11/18/2033    HEPATITIS C SCREENING  Completed    PHQ-2 (once per calendar year)  Completed    Pneumococcal Vaccine: 65+ Years  Completed    URINALYSIS  Completed    ZOSTER IMMUNIZATION  Completed    IPV IMMUNIZATION  Aged Out    HPV IMMUNIZATION  Aged Out    MENINGITIS IMMUNIZATION  Aged Out    RSV MONOCLONAL ANTIBODY  Aged Out    COLORECTAL CANCER SCREENING  Discontinued            Objective    Exam  /69   Pulse 70   Temp 97.9  F (36.6  C) (Oral)   Resp 20   Ht 1.867 m (6' 1.5\")   Wt 96.6 kg (213 lb)   SpO2 98%   BMI 27.72 kg/m     Estimated body mass index is 27.72 kg/m  as calculated from the following:    Height as of this encounter: 1.867 m (6' 1.5\").    Weight as of this encounter: 96.6 kg (213 lb).    Physical Exam  GENERAL: alert and no distress  EYES: Eyes grossly normal to inspection, PERRL and conjunctivae and sclerae normal  HENT: ear canals waxy - lavaged by MA and TM's normal, nose and mouth without ulcers or lesions  NECK: no adenopathy, no asymmetry, masses, or scars  RESP: lungs clear to auscultation - no rales, rhonchi or wheezes  BREAST: normal without masses, tenderness or nipple discharge and no palpable axillary masses or adenopathy  CV: regular rate and rhythm, normal S1 S2, no S3 or S4, no murmur, click or rub, no peripheral edema   ABDOMEN: soft, nontender, no hepatosplenomegaly, no masses and bowel sounds " normal  MS: no gross musculoskeletal defects noted, no edema  SKIN: no suspicious lesions or rashes  NEURO: Normal strength and tone, mentation intact and speech normal  PSYCH: mentation appears normal, affect normal/bright  LYMPH: no cervical, supraclavicular, axillary, or inguinal adenopathy         No data to display                       Signed Electronically by: Eber Man MD

## 2024-07-19 LAB
ALBUMIN SERPL BCG-MCNC: 4 G/DL (ref 3.5–5.2)
ANION GAP SERPL CALCULATED.3IONS-SCNC: 8 MMOL/L (ref 7–15)
BUN SERPL-MCNC: 20.6 MG/DL (ref 8–23)
CALCIUM SERPL-MCNC: 8.9 MG/DL (ref 8.8–10.4)
CHLORIDE SERPL-SCNC: 108 MMOL/L (ref 98–107)
CHOLEST SERPL-MCNC: 120 MG/DL
CREAT SERPL-MCNC: 1.36 MG/DL (ref 0.67–1.17)
EGFRCR SERPLBLD CKD-EPI 2021: 54 ML/MIN/1.73M2
FASTING STATUS PATIENT QL REPORTED: NO
GLUCOSE SERPL-MCNC: 101 MG/DL (ref 70–99)
HCO3 SERPL-SCNC: 25 MMOL/L (ref 22–29)
HDLC SERPL-MCNC: 44 MG/DL
LDLC SERPL CALC-MCNC: 57 MG/DL
NONHDLC SERPL-MCNC: 76 MG/DL
PHOSPHATE SERPL-MCNC: 3.2 MG/DL (ref 2.5–4.5)
POTASSIUM SERPL-SCNC: 4.8 MMOL/L (ref 3.4–5.3)
SODIUM SERPL-SCNC: 141 MMOL/L (ref 135–145)
TRIGL SERPL-MCNC: 97 MG/DL

## 2024-12-15 DIAGNOSIS — N13.8 BENIGN PROSTATIC HYPERPLASIA WITH URINARY OBSTRUCTION: ICD-10-CM

## 2024-12-15 DIAGNOSIS — N40.1 BENIGN PROSTATIC HYPERPLASIA WITH URINARY OBSTRUCTION: ICD-10-CM

## 2024-12-16 RX ORDER — TAMSULOSIN HYDROCHLORIDE 0.4 MG/1
0.4 CAPSULE ORAL DAILY
Qty: 90 CAPSULE | Refills: 0 | Status: SHIPPED | OUTPATIENT
Start: 2024-12-16

## 2025-03-14 DIAGNOSIS — N18.30 STAGE 3 CHRONIC KIDNEY DISEASE, UNSPECIFIED WHETHER STAGE 3A OR 3B CKD (H): ICD-10-CM

## 2025-03-14 DIAGNOSIS — N40.1 BENIGN PROSTATIC HYPERPLASIA WITH URINARY OBSTRUCTION: ICD-10-CM

## 2025-03-14 DIAGNOSIS — R35.0 URINARY FREQUENCY: ICD-10-CM

## 2025-03-14 DIAGNOSIS — E78.5 HYPERLIPIDEMIA LDL GOAL <130: Primary | ICD-10-CM

## 2025-03-14 DIAGNOSIS — N13.8 BENIGN PROSTATIC HYPERPLASIA WITH URINARY OBSTRUCTION: ICD-10-CM

## 2025-03-14 RX ORDER — TAMSULOSIN HYDROCHLORIDE 0.4 MG/1
0.4 CAPSULE ORAL DAILY
Qty: 90 CAPSULE | Refills: 1 | OUTPATIENT
Start: 2025-03-14

## 2025-03-14 NOTE — TELEPHONE ENCOUNTER
Medication Question or Refill    Contacts       Contact Date/Time Type Contact Phone/Fax    03/14/2025 01:52 PM CDT Phone (Incoming) Kaci Calero (Emergency Contact) [phone] (M)            What medication are you calling about (include dose and sig)?: Tamsulosin, 0.4mg and Atorvastatin, 40mg    Preferred Pharmacy:       [id number] - [place], [place] - [address]  [address]  [facility] [place] [address]  Phone: [phone] Fax: [phone]      Controlled Substance Agreement on file:   CSA -- Patient Level:    CSA: None found at the patient level.       Who prescribed the medication?: PCP and Dr. Larose (Cardiologist)     Do you need a refill? Yes    When did you use the medication last? 03/14/25    Patient offered an appointment? No    Do you have any questions or concerns?  No      Could we send this information to you in [facility] or would you prefer to receive a phone call?:   Patient would prefer a phone call   Okay to leave a detailed message?: Yes at Cell number on file:    Telephone Information:   Mobile [phone]

## 2025-03-17 RX ORDER — MIRABEGRON 50 MG/1
50 TABLET, FILM COATED, EXTENDED RELEASE ORAL DAILY
Qty: 90 TABLET | Refills: 0 | Status: SHIPPED | OUTPATIENT
Start: 2025-03-17

## 2025-03-17 RX ORDER — ATORVASTATIN CALCIUM 40 MG/1
40 TABLET, FILM COATED ORAL DAILY
Qty: 90 TABLET | Refills: 0 | Status: SHIPPED | OUTPATIENT
Start: 2025-03-17

## 2025-03-17 NOTE — TELEPHONE ENCOUNTER
Fasting labs scheduled for this summer.    Please place lab orders.    Lelo Robbins RN, BSN  [facility]

## 2025-03-17 NOTE — TELEPHONE ENCOUNTER
This writer attempted to contact patient on 03/17/25      Reason for call refill and left message.      If patient calls back:   Registered Nurse called. Follow Triage Call workflow        Lelo Robbins RN

## 2025-03-17 NOTE — TELEPHONE ENCOUNTER
Pt takes atorvastatin as prescribed; 40 mg once a day. Pt has approximately 14 pills left.    Pt also requesting refill of mirabegron and is taking as prescribed; 50 mg once a day.  Pt has approxiamtely 14 pills left.    Pt requesting refills since in [place] and will be back in [place] April 16th, 2025.    Routing to PCP to approve or deny.    Carrie Judge RN on 3/17/2025 at 11:22 AM

## 2025-04-29 ENCOUNTER — OFFICE VISIT (OUTPATIENT)
Dept: UROLOGY | Facility: CLINIC | Age: 80
End: 2025-04-29
Payer: COMMERCIAL

## 2025-04-29 VITALS — SYSTOLIC BLOOD PRESSURE: 116 MMHG | DIASTOLIC BLOOD PRESSURE: 78 MMHG | OXYGEN SATURATION: 96 % | HEART RATE: 88 BPM

## 2025-04-29 DIAGNOSIS — N13.8 BENIGN PROSTATIC HYPERPLASIA WITH URINARY OBSTRUCTION: ICD-10-CM

## 2025-04-29 DIAGNOSIS — N40.1 BENIGN PROSTATIC HYPERPLASIA WITH URINARY OBSTRUCTION: ICD-10-CM

## 2025-04-29 DIAGNOSIS — R35.0 URINARY FREQUENCY: ICD-10-CM

## 2025-04-29 PROCEDURE — 3074F SYST BP LT 130 MM HG: CPT | Performed by: UROLOGY

## 2025-04-29 PROCEDURE — 3078F DIAST BP <80 MM HG: CPT | Performed by: UROLOGY

## 2025-04-29 PROCEDURE — 99213 OFFICE O/P EST LOW 20 MIN: CPT | Performed by: UROLOGY

## 2025-04-29 RX ORDER — MIRABEGRON 50 MG/1
50 TABLET, FILM COATED, EXTENDED RELEASE ORAL DAILY
Qty: 90 TABLET | Refills: 3 | Status: SHIPPED | OUTPATIENT
Start: 2025-04-29

## 2025-04-29 RX ORDER — TAMSULOSIN HYDROCHLORIDE 0.4 MG/1
0.4 CAPSULE ORAL DAILY
Qty: 90 CAPSULE | Refills: 3 | Status: SHIPPED | OUTPATIENT
Start: 2025-04-29

## 2025-04-29 NOTE — PROGRESS NOTES
"Patient presents to the clinic today for No diagnosis found.  Patient relays the following information: \"My annual.\"    PVR obtained.    Bladder Scan performed. 178 ml maximum residual urine detected after 3 scans. MD informed.     Melanie STEPHEN RN Urology 4/29/2025 2:58 PM      "

## 2025-04-29 NOTE — PROGRESS NOTES
Chief Complaint   Patient presents with    RECHECK       Roman Calero is a 80 year old male who presents today for follow up of   Chief Complaint   Patient presents with    RECHECK    Follow up for benign prostatic hyperplasia.  He is on flomax and myrbetriq.  Meds work well.    Current Outpatient Medications   Medication Sig Dispense Refill    aspirin 81 MG EC tablet Take 81 mg by mouth daily      atorvastatin (LIPITOR) 40 MG tablet Take 1 tablet (40 mg) by mouth daily. ADDIS. 90 tablet 0    loratadine (CLARITIN) 10 MG tablet Take 10 mg by mouth daily      melatonin 5 MG CAPS Take 100 mg by mouth nightly as needed      metoprolol (LOPRESSOR) 25 MG tablet Take 0.25 tablets by mouth daily      mirabegron (MYRBETRIQ) 50 MG 24 hr tablet Take 1 tablet (50 mg) by mouth daily. 90 tablet 3    Misc Natural Products (FIBER 7 PO) Take 2 tablets by mouth daily.      Multiple Vitamins-Minerals (VITRUM 50+ SENIOR MULTI) TABS Take 1 tablet by mouth daily      nitroGLYcerin (NITROSTAT) 0.4 MG sublingual tablet Place 1 tablet (0.4 mg) under the tongue every 5 minutes as needed for chest pain if you are still having symptoms after 3 doses (15 minutes) call 911. 20 tablet 1    tamsulosin (FLOMAX) 0.4 MG capsule Take 1 capsule (0.4 mg) by mouth daily. 90 capsule 3     Allergies   Allergen Reactions    Seasonal Allergies Other (See Comments)      Past Medical History:   Diagnosis Date    Cancer (H) skin    Heart disease      Past Surgical History:   Procedure Laterality Date    BIOPSY      CARDIAC SURGERY      COLONOSCOPY      SOFT TISSUE SURGERY       Family History   Problem Relation Age of Onset    Hypertension Mother     Lipids Mother     Dementia Mother     Cancer Father         Hodgkin's age 63    Other Cancer Father      Social History     Socioeconomic History    Marital status:    Tobacco Use    Smoking status: Never    Smokeless tobacco: Never   Vaping Use    Vaping status: Never Used   Substance and Sexual Activity     Alcohol use: Not Currently     Comment: occ    Drug use: No    Sexual activity: Not Currently     Partners: Female     Birth control/protection: None   Other Topics Concern    Parent/sibling w/ CABG, MI or angioplasty before 65F 55M? No     Social Determinants of Health      [facility]    Financial Resource Strain    [facility]    Social Connections       REVIEW OF SYSTEMS  =================  C: NEGATIVE for fever, chills, change in weight  I: NEGATIVE for worrisome rashes, moles or lesions  E/M: NEGATIVE for ear, mouth and throat problems  R: NEGATIVE for significant cough or SHORTNESS OF BREATH  CV:  NEGATIVE for chest pain, palpitations or peripheral edema  GI: NEGATIVE for nausea, abdominal pain, heartburn, or change in bowel habits  NEURO: NEGATIVE numbness/weakness  : see HPI  PSYCH: NEGATIVE depression/anxiety  LYmph: no new enlarged lymph nodes  Ortho: no new trauma/movements    Physical Exam:  Blood pressure 116/78, pulse 88, SpO2 96%.    GENERAL: healthy, alert and no distress  EYES: Eyes grossly normal to inspection, conjunctivae and sclerae normal  RESP: no audible wheeze, cough, or visible cyanosis.  No visible retractions or increased work of breathing.  Able to speak fully in complete sentences.  NEURO: Cranial nerves grossly intact, mentation intact and speech normal  PSYCH: mentation appears normal, affect normal/bright, judgement and insight intact, normal speech and appearance well-groomed    Assessment/Plan:   (N40.1,  N13.8) Benign prostatic hyperplasia with urinary obstruction  (primary encounter diagnosis)  Comment: PVR  178  ml  Plan: cont with flomax           Recheck pvr in several weeks    (R35.0) Urinary frequency  Comment:    Plan: mirabegron (MYRBETRIQ) 50 MG 24 hr tablet           Patient to call in  one month for response.

## 2025-04-30 ENCOUNTER — TELEPHONE (OUTPATIENT)
Dept: UROLOGY | Facility: CLINIC | Age: 80
End: 2025-04-30
Payer: COMMERCIAL

## 2025-04-30 NOTE — CONFIDENTIAL NOTE
"Pt's wife called into clinic stating \"He has been paying $140 for Myrbetriq. But now they are asking us to pay $367\"    Writer asked that she reach out to the pharmacy to clarify why, then contact us back.    Melanie STEPHEN RN   [facility], Urology   4/30/2025 2:25 PM      "

## 2025-05-14 ENCOUNTER — TELEPHONE (OUTPATIENT)
Dept: UROLOGY | Facility: CLINIC | Age: 80
End: 2025-05-14
Payer: COMMERCIAL

## 2025-05-14 NOTE — CONFIDENTIAL NOTE
Writer called and spoke with patient's wife. Appointment rescheduled.    Melanie STEPHEN RN Urology 5/14/2025 11:15 AM

## 2025-05-14 NOTE — TELEPHONE ENCOUNTER
[facility] Call Center    Phone Message    May a detailed message be left on voicemail: yes     Reason for Call: Other: Spouse Kaci calling to reschedule the nurse visit from 5.15.25 due to forecasted bad weather. Please call Kaci back.     Action Taken: Other: [facility]    Travel Screening: Not Applicable     Date of Service:

## 2025-05-19 NOTE — PROGRESS NOTES
"CHIEF COMPLAINT:   Chief Complaint   Patient presents with    Right Ring Finger - Pain     Trigger finger. Last injection: 4/24/24. Injection lasted about 8 months. Injection worked wonders but slowly the triggering came back in the right ring fingers. He will have to manually open the ring finger at times. The right long finger doesn't work right. He is not able to bend it very far. He can't touch his palm with it.         HISTORY:  Roman Calero is a 80 year old male , Right -hand dominant who is seen in followup for Right hand ring finger pain and catching x 2 years.  The right ring finger will lock and he'll have to manually open it up at times.  Maybe some numbness in the fingers. No tingling. Had a right ring finger trigger injection 4/24/2024, worked \"wonderfully\" but slowly came back. Starting to have problems with the long finger now as well. Can't bend either finger well.    He's concerned of arthritis, stiffness in the fingers.    Had a left index finger trigger injection with Dr. Shasta Razo in the past, 9/2/2021, didn't really help. He had an injection prior to then that helped though. The left index finger just doesn't bend far, not really pain so never has done anything with it.    He'd like to try right ring finger injection today again though.    History of left thumb trigger release in the past.    Suspected cause: Due to unknown factors.    Pain severity: 0/10  Pain quality: sharp  Frequency of symptoms: frequently.  Usual level of work activity: retired.    Other PMH:  has a past medical history of Cancer (H) (skin) and Heart disease.    He has no past medical history of Arthritis, Cerebral infarction (H), Congestive heart failure (H), COPD (chronic obstructive pulmonary disease) (H), Depressive disorder, Diabetes (H), History of blood transfusion, Hypertension, Thyroid disease, or Uncomplicated asthma.  Patient Active Problem List   Diagnosis    HDL deficiency    Elevated PSA    Trigger thumb " of left hand    Hyperlipidemia LDL goal <130    Benign prostatic hyperplasia with urinary obstruction    Encounter for counseling    10 year risk of MI or stroke 7.5% or greater, 12.9% in December 2015 on Simvastatin 20    Coronary artery disease involving native coronary artery of native heart without angina pectoris    AMI (acute mesenteric ischemia)    CKD (chronic kidney disease) stage 3, GFR 30-59 ml/min (H)    Wears hearing aid       Surgical Hx:  has a past surgical history that includes biopsy; Cardiac surgery; colonoscopy; and Soft tissue surgery.    Medications:   Current Outpatient Medications:     aspirin 81 MG EC tablet, Take 81 mg by mouth daily, Disp: , Rfl:     atorvastatin (LIPITOR) 40 MG tablet, Take 1 tablet (40 mg) by mouth daily. ADDIS., Disp: 90 tablet, Rfl: 0    loratadine (CLARITIN) 10 MG tablet, Take 10 mg by mouth daily, Disp: , Rfl:     melatonin 5 MG CAPS, Take 100 mg by mouth nightly as needed, Disp: , Rfl:     metoprolol (LOPRESSOR) 25 MG tablet, Take 0.25 tablets by mouth daily, Disp: , Rfl:     mirabegron (MYRBETRIQ) 50 MG 24 hr tablet, Take 1 tablet (50 mg) by mouth daily., Disp: 90 tablet, Rfl: 3    Misc Natural Products (FIBER 7 PO), Take 2 tablets by mouth daily., Disp: , Rfl:     Multiple Vitamins-Minerals (VITRUM 50+ SENIOR MULTI) TABS, Take 1 tablet by mouth daily, Disp: , Rfl:     nitroGLYcerin (NITROSTAT) 0.4 MG sublingual tablet, Place 1 tablet (0.4 mg) under the tongue every 5 minutes as needed for chest pain if you are still having symptoms after 3 doses (15 minutes) call 911., Disp: 20 tablet, Rfl: 1    tamsulosin (FLOMAX) 0.4 MG capsule, Take 1 capsule (0.4 mg) by mouth daily., Disp: 90 capsule, Rfl: 3    Allergies:   Allergies   Allergen Reactions    Seasonal Allergies Other (See Comments)       Social Hx: retired.   reports that he has never smoked. He has never used smokeless tobacco. He reports that he does not currently use alcohol. He reports that he does not use  "drugs.    Family Hx: family history includes Cancer in his father; Dementia in his mother; Hypertension in his mother; Lipids in his mother; Other Cancer in his father..     REVIEW OF SYSTEMS:    CONSTITUTIONAL:NEGATIVE for fever, chills, change in weight  INTEGUMENTARY/SKIN: NEGATIVE for worrisome rashes, moles or lesions  MUSCULOSKELETAL:See HPI above  Neurology: see HPI above.      EXAM:  Ht 1.867 m (6' 1.5\")   Wt 101 kg (222 lb 11.2 oz)   BMI 28.98 kg/m      GENERAL APPEARANCE: healthy, alert and no distress ; accompanied by his wife today.  GAIT: NORMAL  SKIN: no suspicious lesions or rashes  RESPIRATORY: No increased work of breathing.  NEURO:  strength: decreased, thenar fasiculations:negative;  Thenar atrophy:Mild. Sensation intact in  all digits, reflexes normal in upper extremities.   PSYCH:  mentation appears normal and affect normal/bright, not anxious.    MUSCULOSKELETAL:    RIGHT HAND/FINGERS:   Skin intact. Normal wear pattern, color and tone.   No clubbing or nail pitting.  Range of Motion decreased right ring finger  Possible slight dupuytrens cord ring finger without contracture.       Good capillary refill to all fingers. 2+ radial pulse.    Triggering noted: right ring finger, right long finger  Tenderness over A1 pulley of right ring finger and right long finger.      1st carpometacarpal grind: positive     Intact EPL, FPL, FDP, EDC, wrist flexion/extension, biceps/triceps  Intact sensation to light touch in median, radial and ulnar nerve distributions.      LEFT HAND/FINGERS:   Skin intact. Normal wear pattern, color and tone.   No clubbing or nail pitting.  Range of Motion decreased left index finger with pain.    Dupuytrens cord, nodules of the left small finger with slight contracture.    Good capillary refill to all fingers. 2+ radial pulse.    Triggering noted: none  Tenderness over A1 pulley of none of the fingers.      1st carpometacarpal grind: positive     Intact EPL, FPL, FDP, " EDC, wrist flexion/extension, biceps/triceps  Intact sensation to light touch in median, radial and ulnar nerve distributions.      XRAYS: 3 views bilateral hands 4/24/2024 --- Both hands are negative for fracture or dislocation. Degenerative change at the STT joint and first CMC joint bilaterally. Degenerative change at the right first MCP joint. No erosive changes are identified..      ASSESSMENT: 79yo RHD male with right ring finger trigger finger, right long finger trigger finger. Bilateral hand osteoarthritis.        PLAN:     We talked about the options: taping the PIP, splinting the PIP joint, corticosteroid injections, hand therapy and surgery. I explained the risks and benefits of each, including recurrence. I have explained the nature of the surgical procedure, the risks and recovery time with the patient.  * At this time, patient would like to proceed with: injection  * A Right ring  and right long trigger finger injection was offered and patient elected to proceed. Tolerated well without apparent complication. See procedure note below.  * an oval-8 finger splint was provided for both the ring and long fingers right hand.  * Return to clinic as needed.    Dyllan Rodriguez M.D., M.S.  Dept. of Orthopaedic Surgery  [facility]     Hand / Upper Extremity Injection/Arthrocentesis: R long A1    Date/Time: 5/22/2025 3:00 PM    Performed by: Dyllan Rodriguez MD  Authorized by: Dyllan Rodriguez MD    Indications:  Pain  Needle Size:  25 G  Guidance: landmark    Approach:  Volar  Condition: trigger finger    Location:  Long finger    Site:  R long A1  Medications:  20 mg triamcinolone 40 MG/ML; 0.5 mL BUPivacaine 0.25 %  Outcome:  Tolerated well, no immediate complications  Procedure discussed: discussed risks, benefits, and alternatives    Consent Given by:  Patient  Timeout: timeout called immediately prior to procedure    Prep: patient was prepped and draped in usual sterile fashion    Hand /  Upper Extremity Injection/Arthrocentesis: R ring A1    Date/Time: 5/22/2025 3:01 PM    Performed by: Dyllan Rodriguez MD  Authorized by: Dyllan Rodriguez MD    Indications:  Pain  Needle Size:  25 G  Guidance: landmark    Approach:  Volar  Condition: trigger finger    Location:  Ring finger    Site:  R ring A1  Medications:  20 mg triamcinolone 40 MG/ML; 0.5 mL BUPivacaine 0.25 %  Outcome:  Tolerated well, no immediate complications  Procedure discussed: discussed risks, benefits, and alternatives    Consent Given by:  Patient  Timeout: timeout called immediately prior to procedure    Prep: patient was prepped and draped in usual sterile fashion

## 2025-05-20 ENCOUNTER — ALLIED HEALTH/NURSE VISIT (OUTPATIENT)
Dept: UROLOGY | Facility: CLINIC | Age: 80
End: 2025-05-20
Payer: COMMERCIAL

## 2025-05-20 DIAGNOSIS — N40.1 BENIGN PROSTATIC HYPERPLASIA WITH URINARY OBSTRUCTION: Primary | ICD-10-CM

## 2025-05-20 DIAGNOSIS — N13.8 BENIGN PROSTATIC HYPERPLASIA WITH URINARY OBSTRUCTION: Primary | ICD-10-CM

## 2025-05-20 PROCEDURE — 51798 US URINE CAPACITY MEASURE: CPT

## 2025-05-20 RX ORDER — FINASTERIDE 5 MG/1
5 TABLET, FILM COATED ORAL DAILY
Qty: 90 TABLET | Refills: 0 | Status: SHIPPED | OUTPATIENT
Start: 2025-05-20

## 2025-05-20 NOTE — PATIENT INSTRUCTIONS
STOP MYRBETRIQ  START PROSCAR  CONTINUE FLOMAX  4. Call [phone] to schedule an appointment in 3-4 months.

## 2025-05-20 NOTE — PROGRESS NOTES
Bladder Scan performed. 208 ml maximum residual urine detected after 3 scans. MD informed.     This service provided today was under the direct supervision of Dr. Kalie MD, who was available if needed.    Melanie STEPHEN RN Urology 5/20/2025 12:32 PM

## 2025-05-22 ENCOUNTER — OFFICE VISIT (OUTPATIENT)
Dept: ORTHOPEDICS | Facility: CLINIC | Age: 80
End: 2025-05-22
Payer: COMMERCIAL

## 2025-05-22 VITALS — HEIGHT: 74 IN | WEIGHT: 222.7 LBS | BODY MASS INDEX: 28.58 KG/M2

## 2025-05-22 DIAGNOSIS — M65.341 TRIGGER FINGER, RIGHT RING FINGER: Primary | ICD-10-CM

## 2025-05-22 DIAGNOSIS — M65.331 TRIGGER FINGER, RIGHT MIDDLE FINGER: ICD-10-CM

## 2025-05-22 RX ORDER — TRIAMCINOLONE ACETONIDE 40 MG/ML
20 INJECTION, SUSPENSION INTRA-ARTICULAR; INTRAMUSCULAR
Status: COMPLETED | OUTPATIENT
Start: 2025-05-22 | End: 2025-05-22

## 2025-05-22 RX ORDER — BUPIVACAINE HYDROCHLORIDE 2.5 MG/ML
0.5 INJECTION, SOLUTION INFILTRATION; PERINEURAL
Status: COMPLETED | OUTPATIENT
Start: 2025-05-22 | End: 2025-05-22

## 2025-05-22 RX ADMIN — BUPIVACAINE HYDROCHLORIDE 0.5 ML: 2.5 INJECTION, SOLUTION INFILTRATION; PERINEURAL at 15:01

## 2025-05-22 RX ADMIN — TRIAMCINOLONE ACETONIDE 20 MG: 40 INJECTION, SUSPENSION INTRA-ARTICULAR; INTRAMUSCULAR at 15:00

## 2025-05-22 RX ADMIN — BUPIVACAINE HYDROCHLORIDE 0.5 ML: 2.5 INJECTION, SOLUTION INFILTRATION; PERINEURAL at 15:00

## 2025-05-22 RX ADMIN — TRIAMCINOLONE ACETONIDE 20 MG: 40 INJECTION, SUSPENSION INTRA-ARTICULAR; INTRAMUSCULAR at 15:01

## 2025-05-22 ASSESSMENT — PAIN SCALES - GENERAL: PAINLEVEL_OUTOF10: NO PAIN (0)

## 2025-05-22 NOTE — LETTER
"5/22/2025      Roman Calero  [address] [address] [address]      Dear Colleague,    Thank you for referring your patient, Roman Calero, to the [facility] [place]. Please see a copy of my visit note below.    CHIEF COMPLAINT:   Chief Complaint   Patient presents with     Right Ring Finger - Pain     Trigger finger. Last injection: 4/24/24. Injection lasted about 8 months. Injection worked wonders but slowly the triggering came back in the right ring fingers. He will have to manually open the ring finger at times. The right long finger doesn't work right. He is not able to bend it very far. He can't touch his palm with it.         HISTORY:  Roman Calero is a 80 year old male , Right -hand dominant who is seen in followup for Right hand ring finger pain and catching x 2 years.  The right ring finger will lock and he'll have to manually open it up at times.  Maybe some numbness in the fingers. No tingling. Had a right ring finger trigger injection 4/24/2024, worked \"wonderfully\" but slowly came back. Starting to have problems with the long finger now as well. Can't bend either finger well.    He's concerned of arthritis, stiffness in the fingers.    Had a left index finger trigger injection with Dr. Shasta Razo in the past, 9/2/2021, didn't really help. He had an injection prior to then that helped though. The left index finger just doesn't bend far, not really pain so never has done anything with it.    He'd like to try right ring finger injection today again though.    History of left thumb trigger release in the past.    Suspected cause: Due to unknown factors.    Pain severity: 0/10  Pain quality: sharp  Frequency of symptoms: frequently.  Usual level of work activity: retired.    Other PMH:  has a past medical history of Cancer (H) (skin) and Heart disease.    He has no past medical history of Arthritis, Cerebral infarction (H), Congestive heart failure (H), COPD (chronic " obstructive pulmonary disease) (H), Depressive disorder, Diabetes (H), History of blood transfusion, Hypertension, Thyroid disease, or Uncomplicated asthma.  Patient Active Problem List   Diagnosis     HDL deficiency     Elevated PSA     Trigger thumb of left hand     Hyperlipidemia LDL goal <130     Benign prostatic hyperplasia with urinary obstruction     Encounter for counseling     10 year risk of MI or stroke 7.5% or greater, 12.9% in December 2015 on Simvastatin 20     Coronary artery disease involving native coronary artery of native heart without angina pectoris     AMI (acute mesenteric ischemia)     CKD (chronic kidney disease) stage 3, GFR 30-59 ml/min (H)     Wears hearing aid       Surgical Hx:  has a past surgical history that includes biopsy; Cardiac surgery; colonoscopy; and Soft tissue surgery.    Medications:   Current Outpatient Medications:      aspirin 81 MG EC tablet, Take 81 mg by mouth daily, Disp: , Rfl:      atorvastatin (LIPITOR) 40 MG tablet, Take 1 tablet (40 mg) by mouth daily. ADDIS., Disp: 90 tablet, Rfl: 0     loratadine (CLARITIN) 10 MG tablet, Take 10 mg by mouth daily, Disp: , Rfl:      melatonin 5 MG CAPS, Take 100 mg by mouth nightly as needed, Disp: , Rfl:      metoprolol (LOPRESSOR) 25 MG tablet, Take 0.25 tablets by mouth daily, Disp: , Rfl:      mirabegron (MYRBETRIQ) 50 MG 24 hr tablet, Take 1 tablet (50 mg) by mouth daily., Disp: 90 tablet, Rfl: 3     Misc Natural Products (FIBER 7 PO), Take 2 tablets by mouth daily., Disp: , Rfl:      Multiple Vitamins-Minerals (VITRUM 50+ SENIOR MULTI) TABS, Take 1 tablet by mouth daily, Disp: , Rfl:      nitroGLYcerin (NITROSTAT) 0.4 MG sublingual tablet, Place 1 tablet (0.4 mg) under the tongue every 5 minutes as needed for chest pain if you are still having symptoms after 3 doses (15 minutes) call 911., Disp: 20 tablet, Rfl: 1     tamsulosin (FLOMAX) 0.4 MG capsule, Take 1 capsule (0.4 mg) by mouth daily., Disp: 90 capsule, Rfl:  "3    Allergies:   Allergies   Allergen Reactions     Seasonal Allergies Other (See Comments)       Social Hx: retired.   reports that he has never smoked. He has never used smokeless tobacco. He reports that he does not currently use alcohol. He reports that he does not use drugs.    Family Hx: family history includes Cancer in his father; Dementia in his mother; Hypertension in his mother; Lipids in his mother; Other Cancer in his father..     REVIEW OF SYSTEMS:    CONSTITUTIONAL:NEGATIVE for fever, chills, change in weight  INTEGUMENTARY/SKIN: NEGATIVE for worrisome rashes, moles or lesions  MUSCULOSKELETAL:See HPI above  Neurology: see HPI above.      EXAM:  Ht 1.867 m (6' 1.5\")   Wt 101 kg (222 lb 11.2 oz)   BMI 28.98 kg/m      GENERAL APPEARANCE: healthy, alert and no distress ; accompanied by his wife today.  GAIT: NORMAL  SKIN: no suspicious lesions or rashes  RESPIRATORY: No increased work of breathing.  NEURO:  strength: decreased, thenar fasiculations:negative;  Thenar atrophy:Mild. Sensation intact in  all digits, reflexes normal in upper extremities.   PSYCH:  mentation appears normal and affect normal/bright, not anxious.    MUSCULOSKELETAL:    RIGHT HAND/FINGERS:   Skin intact. Normal wear pattern, color and tone.   No clubbing or nail pitting.  Range of Motion decreased right ring finger  Possible slight dupuytrens cord ring finger without contracture.       Good capillary refill to all fingers. 2+ radial pulse.    Triggering noted: right ring finger, right long finger  Tenderness over A1 pulley of right ring finger and right long finger.      1st carpometacarpal grind: positive     Intact EPL, FPL, FDP, EDC, wrist flexion/extension, biceps/triceps  Intact sensation to light touch in median, radial and ulnar nerve distributions.      LEFT HAND/FINGERS:   Skin intact. Normal wear pattern, color and tone.   No clubbing or nail pitting.  Range of Motion decreased left index finger with " pain.    Dupuytrens cord, nodules of the left small finger with slight contracture.    Good capillary refill to all fingers. 2+ radial pulse.    Triggering noted: none  Tenderness over A1 pulley of none of the fingers.      1st carpometacarpal grind: positive     Intact EPL, FPL, FDP, EDC, wrist flexion/extension, biceps/triceps  Intact sensation to light touch in median, radial and ulnar nerve distributions.      XRAYS: 3 views bilateral hands 4/24/2024 --- Both hands are negative for fracture or dislocation. Degenerative change at the STT joint and first CMC joint bilaterally. Degenerative change at the right first MCP joint. No erosive changes are identified..      ASSESSMENT: 79yo RHD male with right ring finger trigger finger, right long finger trigger finger. Bilateral hand osteoarthritis.        PLAN:     We talked about the options: taping the PIP, splinting the PIP joint, corticosteroid injections, hand therapy and surgery. I explained the risks and benefits of each, including recurrence. I have explained the nature of the surgical procedure, the risks and recovery time with the patient.  * At this time, patient would like to proceed with: injection  * A Right ring  and right long trigger finger injection was offered and patient elected to proceed. Tolerated well without apparent complication. See procedure note below.  * an oval-8 finger splint was provided for both the ring and long fingers right hand.  * Return to clinic as needed.    Dyllan Rodriguez M.D., M.S.  Dept. of Orthopaedic Surgery  [facility]     Hand / Upper Extremity Injection/Arthrocentesis: R long A1    Date/Time: 5/22/2025 3:00 PM    Performed by: Dyllan Rodriguez MD  Authorized by: Dyllan Rodriguez MD    Indications:  Pain  Needle Size:  25 G  Guidance: landmark    Approach:  Volar  Condition: trigger finger    Location:  Long finger    Site:  R long A1  Medications:  20 mg triamcinolone 40 MG/ML; 0.5 mL BUPivacaine 0.25  %  Outcome:  Tolerated well, no immediate complications  Procedure discussed: discussed risks, benefits, and alternatives    Consent Given by:  Patient  Timeout: timeout called immediately prior to procedure    Prep: patient was prepped and draped in usual sterile fashion    Hand / Upper Extremity Injection/Arthrocentesis: R ring A1    Date/Time: 5/22/2025 3:01 PM    Performed by: Dyllan Rodriguez MD  Authorized by: Dyllan Rodriguez MD    Indications:  Pain  Needle Size:  25 G  Guidance: landmark    Approach:  Volar  Condition: trigger finger    Location:  Ring finger    Site:  R ring A1  Medications:  20 mg triamcinolone 40 MG/ML; 0.5 mL BUPivacaine 0.25 %  Outcome:  Tolerated well, no immediate complications  Procedure discussed: discussed risks, benefits, and alternatives    Consent Given by:  Patient  Timeout: timeout called immediately prior to procedure    Prep: patient was prepped and draped in usual sterile fashion          Again, thank you for allowing me to participate in the care of your patient.        Sincerely,        Dyllan Rodriguez MD    Electronically signed

## 2025-07-07 ENCOUNTER — ALLIED HEALTH/NURSE VISIT (OUTPATIENT)
Dept: UROLOGY | Facility: CLINIC | Age: 80
End: 2025-07-07
Payer: COMMERCIAL

## 2025-07-07 ENCOUNTER — TELEPHONE (OUTPATIENT)
Dept: UROLOGY | Facility: CLINIC | Age: 80
End: 2025-07-07

## 2025-07-07 DIAGNOSIS — R35.0 URINARY FREQUENCY: Primary | ICD-10-CM

## 2025-07-07 DIAGNOSIS — R35.0 URINARY FREQUENCY: ICD-10-CM

## 2025-07-07 LAB
ALBUMIN UR-MCNC: NEGATIVE MG/DL
APPEARANCE UR: CLEAR
BACTERIA #/AREA URNS HPF: ABNORMAL /HPF
BILIRUB UR QL STRIP: NEGATIVE
COLOR UR AUTO: YELLOW
GLUCOSE UR STRIP-MCNC: NEGATIVE MG/DL
HGB UR QL STRIP: ABNORMAL
KETONES UR STRIP-MCNC: NEGATIVE MG/DL
LEUKOCYTE ESTERASE UR QL STRIP: NEGATIVE
NITRATE UR QL: NEGATIVE
PH UR STRIP: 6 [PH] (ref 5–7)
RBC #/AREA URNS AUTO: ABNORMAL /HPF
SP GR UR STRIP: 1.02 (ref 1–1.03)
UROBILINOGEN UR STRIP-ACNC: 0.2 E.U./DL
WBC #/AREA URNS AUTO: ABNORMAL /HPF

## 2025-07-07 PROCEDURE — 99207 PR NO CHARGE NURSE ONLY: CPT

## 2025-07-07 PROCEDURE — 81001 URINALYSIS AUTO W/SCOPE: CPT

## 2025-07-07 NOTE — PROGRESS NOTES
Nurse Visit    Chief Complaint: Patient presents to clinic for UA/UC and PVR.    Allergies:   Allergies   Allergen Reactions    Seasonal Allergies Other (See Comments)       Medications:   Current Outpatient Medications:     aspirin 81 MG EC tablet, Take 81 mg by mouth daily, Disp: , Rfl:     atorvastatin (LIPITOR) 40 MG tablet, Take 1 tablet (40 mg) by mouth daily. ADDIS., Disp: 90 tablet, Rfl: 0    finasteride (PROSCAR) 5 MG tablet, Take 1 tablet (5 mg) by mouth daily. (Patient not taking: Reported on 5/22/2025), Disp: 90 tablet, Rfl: 0    loratadine (CLARITIN) 10 MG tablet, Take 10 mg by mouth daily, Disp: , Rfl:     melatonin 5 MG CAPS, Take 100 mg by mouth nightly as needed, Disp: , Rfl:     metoprolol (LOPRESSOR) 25 MG tablet, Take 0.25 tablets by mouth daily, Disp: , Rfl:     Misc Natural Products (FIBER 7 PO), Take 2 tablets by mouth daily., Disp: , Rfl:     Multiple Vitamins-Minerals (VITRUM 50+ SENIOR MULTI) TABS, Take 1 tablet by mouth daily, Disp: , Rfl:     nitroGLYcerin (NITROSTAT) 0.4 MG sublingual tablet, Place 1 tablet (0.4 mg) under the tongue every 5 minutes as needed for chest pain if you are still having symptoms after 3 doses (15 minutes) call 911., Disp: 20 tablet, Rfl: 1    tamsulosin (FLOMAX) 0.4 MG capsule, Take 1 capsule (0.4 mg) by mouth daily., Disp: 90 capsule, Rfl: 3    Assessment:    Pt has not been taking the Finasteride due to side effects Getting up 4-5 times at night vs. Mybretriq pt was getting up 1-2 times at night. He would like to be back on Mrybetriq and Flomax vs. Flomax and finasteride.     Plan:         1. Patient will follow up oif nbeeded         2. Treatment provided included: UA/PVR.    Provider notified regarding concerns: yes    Education documentation:    To improve patient experience and health outcomes, patient was educated on Karen STEPHEN RN Urology 7/7/2025 1:33 PM

## 2025-07-07 NOTE — TELEPHONE ENCOUNTER
Returned call- finasteride was started, but has only had 16days worth. He had just refilled the myrbetriq in May- he stopped this medication 16days ago. He is taking the flomax.   Fever-no  Chills-no  Nausea/vomiting-no  Flank pain-unable to tell  New or worsen leakage or loss of control- no   Burning with urination-no  Painful urination-no  Increased urgency of urination-yes  Increased frequency of urination-yes  Blood in the urine- no  Clots in the urine-no  Bladder pressure-unable anser  Feelings of incomplete emptying-yes      Needs UA/UC and PVR.    DAYDAY Rey RN 7/7/2025 12:38 PM

## 2025-07-07 NOTE — TELEPHONE ENCOUNTER
[facility] Call Center    Phone Message    May a detailed message be left on voicemail: yes     Reason for Call: Other: Spouse is calling because patient is having to use the bathroom more frequently since starting the new medication; as often at every 10 minutes. Patient is not sleeping and is also keeping spouse up. They are having to cancel appointments and not leaving the house because of this. Spouse is requesting a call back.     Action Taken: Message routed to:  Other: Urology    Travel Screening: Not Applicable

## 2025-07-07 NOTE — Clinical Note
Pt did not like the SE of Finasteride, he is requesting to go back on myrbetriq, Flomax and STOP Finasteride. Please advise, he is also retaining still at 210ml.

## 2025-07-09 ENCOUNTER — TELEPHONE (OUTPATIENT)
Dept: UROLOGY | Facility: CLINIC | Age: 80
End: 2025-07-09
Payer: COMMERCIAL

## 2025-07-09 ENCOUNTER — MYC MEDICAL ADVICE (OUTPATIENT)
Dept: UROLOGY | Facility: CLINIC | Age: 80
End: 2025-07-09
Payer: COMMERCIAL

## 2025-07-09 DIAGNOSIS — N40.1 BENIGN PROSTATIC HYPERPLASIA WITH URINARY OBSTRUCTION: ICD-10-CM

## 2025-07-09 DIAGNOSIS — R35.0 URINARY FREQUENCY: Primary | ICD-10-CM

## 2025-07-09 DIAGNOSIS — N13.8 BENIGN PROSTATIC HYPERPLASIA WITH URINARY OBSTRUCTION: ICD-10-CM

## 2025-07-09 DIAGNOSIS — R35.0 URINARY FREQUENCY: ICD-10-CM

## 2025-07-09 RX ORDER — MIRABEGRON 50 MG/1
50 TABLET, FILM COATED, EXTENDED RELEASE ORAL DAILY
Qty: 30 TABLET | Refills: 1 | Status: CANCELLED | OUTPATIENT
Start: 2025-07-09

## 2025-07-09 NOTE — TELEPHONE ENCOUNTER
[facility] Call Center    Phone Message    May a detailed message be left on voicemail: yes     Reason for Call: Other: pt wife calling wants a call back regarding what is going on, please reach out to them     Action Taken: Other: urology    Travel Screening: Not Applicable     Date of Service:

## 2025-07-09 NOTE — TELEPHONE ENCOUNTER
Writer spoke with patient's wife (consent to communicate on file) who stated that patient has not been taking the finasteride due increase in urination. Patient was getting up several times a night. Patient's wife is requesting a refill Mybretriq as he doesn't get up as much to urinate at night with this medication. Sending refill request to provider for review.   Britany Orellana RN on 7/9/2025 at 12:35 PM

## 2025-07-09 NOTE — TELEPHONE ENCOUNTER
Closing encounter as message has been sent to patient via [employer].   Britany Orellana RN on 7/9/2025 at 1:18 PM

## 2025-07-10 RX ORDER — MIRABEGRON 50 MG/1
50 TABLET, FILM COATED, EXTENDED RELEASE ORAL DAILY
Qty: 90 TABLET | Refills: 3 | Status: SHIPPED | OUTPATIENT
Start: 2025-07-10

## 2025-07-17 ENCOUNTER — LAB (OUTPATIENT)
Dept: LAB | Facility: CLINIC | Age: 80
End: 2025-07-17
Payer: COMMERCIAL

## 2025-07-17 DIAGNOSIS — N18.30 STAGE 3 CHRONIC KIDNEY DISEASE, UNSPECIFIED WHETHER STAGE 3A OR 3B CKD (H): ICD-10-CM

## 2025-07-17 DIAGNOSIS — E78.5 HYPERLIPIDEMIA LDL GOAL <130: ICD-10-CM

## 2025-07-17 LAB
ALBUMIN SERPL BCG-MCNC: 3.7 G/DL (ref 3.5–5.2)
ANION GAP SERPL CALCULATED.3IONS-SCNC: 8 MMOL/L (ref 7–15)
BUN SERPL-MCNC: 24 MG/DL (ref 8–23)
CALCIUM SERPL-MCNC: 8.9 MG/DL (ref 8.8–10.4)
CHLORIDE SERPL-SCNC: 108 MMOL/L (ref 98–107)
CHOLEST SERPL-MCNC: 126 MG/DL
CREAT SERPL-MCNC: 1.27 MG/DL (ref 0.67–1.17)
EGFRCR SERPLBLD CKD-EPI 2021: 58 ML/MIN/1.73M2
FASTING STATUS PATIENT QL REPORTED: YES
GLUCOSE SERPL-MCNC: 92 MG/DL (ref 70–99)
HCO3 SERPL-SCNC: 26 MMOL/L (ref 22–29)
HDLC SERPL-MCNC: 47 MG/DL
HGB BLD-MCNC: 14.5 G/DL (ref 13.3–17.7)
LDLC SERPL CALC-MCNC: 60 MG/DL
MCV RBC AUTO: 101 FL (ref 78–100)
NONHDLC SERPL-MCNC: 79 MG/DL
PHOSPHATE SERPL-MCNC: 3 MG/DL (ref 2.5–4.5)
POTASSIUM SERPL-SCNC: 4.5 MMOL/L (ref 3.4–5.3)
SODIUM SERPL-SCNC: 142 MMOL/L (ref 135–145)
TRIGL SERPL-MCNC: 94 MG/DL